# Patient Record
Sex: MALE | Race: ASIAN | Employment: FULL TIME | URBAN - METROPOLITAN AREA
[De-identification: names, ages, dates, MRNs, and addresses within clinical notes are randomized per-mention and may not be internally consistent; named-entity substitution may affect disease eponyms.]

---

## 2018-06-08 ENCOUNTER — HOSPITAL ENCOUNTER (EMERGENCY)
Age: 58
Discharge: HOME | End: 2018-06-08
Attending: EMERGENCY MEDICINE | Admitting: EMERGENCY MEDICINE
Payer: COMMERCIAL

## 2018-06-08 ENCOUNTER — APPOINTMENT (OUTPATIENT)
Dept: GENERAL RADIOLOGY | Age: 58
End: 2018-06-08
Attending: NURSE PRACTITIONER
Payer: COMMERCIAL

## 2018-06-08 VITALS
HEIGHT: 65 IN | OXYGEN SATURATION: 100 % | RESPIRATION RATE: 18 BRPM | TEMPERATURE: 98.4 F | SYSTOLIC BLOOD PRESSURE: 182 MMHG | WEIGHT: 204 LBS | HEART RATE: 67 BPM | DIASTOLIC BLOOD PRESSURE: 106 MMHG | BODY MASS INDEX: 33.99 KG/M2

## 2018-06-08 DIAGNOSIS — M17.12 PRIMARY OSTEOARTHRITIS OF LEFT KNEE: ICD-10-CM

## 2018-06-08 DIAGNOSIS — M25.562 ACUTE PAIN OF LEFT KNEE: Primary | ICD-10-CM

## 2018-06-08 DIAGNOSIS — R03.0 ELEVATED BLOOD PRESSURE READING: ICD-10-CM

## 2018-06-08 PROCEDURE — 99282 EMERGENCY DEPT VISIT SF MDM: CPT

## 2018-06-08 PROCEDURE — 74011250637 HC RX REV CODE- 250/637: Performed by: NURSE PRACTITIONER

## 2018-06-08 PROCEDURE — 73562 X-RAY EXAM OF KNEE 3: CPT

## 2018-06-08 PROCEDURE — L1830 KO IMMOB CANVAS LONG PRE OTS: HCPCS

## 2018-06-08 RX ORDER — HYDROCODONE BITARTRATE AND ACETAMINOPHEN 5; 325 MG/1; MG/1
1 TABLET ORAL
Qty: 20 TAB | Refills: 0 | Status: SHIPPED | OUTPATIENT
Start: 2018-06-08 | End: 2022-01-21

## 2018-06-08 RX ORDER — MELOXICAM 7.5 MG/1
7.5 TABLET ORAL DAILY
Qty: 7 TAB | Refills: 0 | Status: SHIPPED | OUTPATIENT
Start: 2018-06-08 | End: 2022-01-21

## 2018-06-08 RX ORDER — HYDROCODONE BITARTRATE AND ACETAMINOPHEN 5; 325 MG/1; MG/1
2 TABLET ORAL
Status: COMPLETED | OUTPATIENT
Start: 2018-06-08 | End: 2018-06-08

## 2018-06-08 RX ORDER — ONDANSETRON 4 MG/1
4 TABLET, ORALLY DISINTEGRATING ORAL
Status: COMPLETED | OUTPATIENT
Start: 2018-06-08 | End: 2018-06-08

## 2018-06-08 RX ORDER — DICLOFENAC SODIUM 10 MG/G
2 GEL TOPICAL 4 TIMES DAILY
Qty: 100 G | Refills: 0 | Status: SHIPPED | OUTPATIENT
Start: 2018-06-08 | End: 2022-01-21

## 2018-06-08 RX ORDER — MELOXICAM 15 MG/1
15 TABLET ORAL DAILY
Qty: 20 TAB | Refills: 0 | Status: SHIPPED | OUTPATIENT
Start: 2018-06-08 | End: 2018-06-08

## 2018-06-08 RX ADMIN — ONDANSETRON 4 MG: 4 TABLET, ORALLY DISINTEGRATING ORAL at 21:09

## 2018-06-08 RX ADMIN — HYDROCODONE BITARTRATE AND ACETAMINOPHEN 2 TABLET: 5; 325 TABLET ORAL at 21:09

## 2018-06-09 NOTE — DISCHARGE INSTRUCTIONS
Knee Arthritis: Care Instructions  Your Care Instructions    Knee arthritis is a breakdown of the cartilage that cushions your knee joint. When the cartilage wears down, your bones rub against each other. This causes pain and stiffness. Knee arthritis tends to get worse with time. Treatment for knee arthritis involves reducing pain, making the leg muscles stronger, and staying at a healthy body weight. The treatment usually does not improve the health of the cartilage, but it can reduce pain and improve how well your knee works. You can take simple measures to protect your knee joints, ease your pain, and help you stay active. Follow-up care is a key part of your treatment and safety. Be sure to make and go to all appointments, and call your doctor if you are having problems. It's also a good idea to know your test results and keep a list of the medicines you take. How can you care for yourself at home? · Know that knee arthritis will cause more pain on some days than on others. · Stay at a healthy weight. Lose weight if you are overweight. When you stand up, the pressure on your knees from every pound of body weight is multiplied four times. So if you lose 10 pounds, you will reduce the pressure on your knees by 40 pounds. · Talk to your doctor or physical therapist about exercises that will help ease joint pain. ¨ Stretch to help prevent stiffness and to prevent injury before you exercise. You may enjoy gentle forms of yoga to help keep your knee joints and muscles flexible. ¨ Walk instead of jog. ¨ Ride a bike. This makes your thigh muscles stronger and takes pressure off your knee. ¨ Wear well-fitting and comfortable shoes. ¨ Exercise in chest-deep water. This can help you exercise longer with less pain. ¨ Avoid exercises that include squatting or kneeling. They can put a lot of strain on your knees.   ¨ Talk to your doctor to make sure that the exercise you do is not making the arthritis worse.  · Do not sit for long periods of time. Try to walk once in a while to keep your knee from getting stiff. · Ask your doctor or physical therapist whether shoe inserts may reduce your arthritis pain. · If you can afford it, get new athletic shoes at least every year. This can help reduce the strain on your knees. · Use a device to help you do everyday activities. ¨ A cane or walking stick can help you keep your balance when you walk. Hold the cane or walking stick in the hand opposite the painful knee. ¨ If you feel like you may fall when you walk, try using crutches or a front-wheeled walker. These can prevent falls that could cause more damage to your knee. ¨ A knee brace may help keep your knee stable and prevent pain. ¨ You also can use other things to make life easier, such as a higher toilet seat and handrails in the bathtub or shower. · Take pain medicines exactly as directed. ¨ Do not wait until you are in severe pain. You will get better results if you take it sooner. ¨ If you are not taking a prescription pain medicine, take an over-the-counter medicine such as acetaminophen (Tylenol), ibuprofen (Advil, Motrin), or naproxen (Aleve). Read and follow all instructions on the label. ¨ Do not take two or more pain medicines at the same time unless the doctor told you to. Many pain medicines have acetaminophen, which is Tylenol. Too much acetaminophen (Tylenol) can be harmful. ¨ Tell your doctor if you take a blood thinner, have diabetes, or have allergies to shellfish. · Ask your doctor if you might benefit from a shot of steroid medicine into your knee. This may provide pain relief for several months. · Many people take the supplements glucosamine and chondroitin for osteoarthritis. Some people feel they help, but the medical research does not show that they work. Talk to your doctor before you take these supplements. When should you call for help?   Call your doctor now or seek immediate medical care if:  ? · You have sudden swelling, warmth, or pain in your knee. ? · You have knee pain and a fever or rash. ? · You have such bad pain that you cannot use your knee. ? Watch closely for changes in your health, and be sure to contact your doctor if you have any problems. Where can you learn more? Go to http://deanna-jarret.info/. Enter K874 in the search box to learn more about \"Knee Arthritis: Care Instructions. \"  Current as of: October 31, 2016  Content Version: 11.4  © 7817-2151 [employer]. Care instructions adapted under license by [employer] (which disclaims liability or warranty for this information). If you have questions about a medical condition or this instruction, always ask your healthcare professional. [address] any warranty or liability for your use of this information. Knee Pain or Injury: Care Instructions  Your Care Instructions    Injuries are a common cause of knee problems. Sudden (acute) injuries may be caused by a direct blow to the knee. They can also be caused by abnormal twisting, bending, or falling on the knee. Pain, bruising, or swelling may be severe, and may start within minutes of the injury. Overuse is another cause of knee pain. Other causes are climbing stairs, kneeling, and other activities that use the knee. Everyday wear and tear, especially as you get older, also can cause knee pain. Rest, along with home treatment, often relieves pain and allows your knee to heal. If you have a serious knee injury, you may need tests and treatment. Follow-up care is a key part of your treatment and safety. Be sure to make and go to all appointments, and call your doctor if you are having problems. It's also a good idea to know your test results and keep a list of the medicines you take. How can you care for yourself at home? · Be safe with medicines.  Read and follow all instructions on the label.  ¨ If the doctor gave you a prescription medicine for pain, take it as prescribed. ¨ If you are not taking a prescription pain medicine, ask your doctor if you can take an over-the-counter medicine. · Rest and protect your knee. Take a break from any activity that may cause pain. · Put ice or a cold pack on your knee for 10 to 20 minutes at a time. Put a thin cloth between the ice and your skin. · Prop up a sore knee on a pillow when you ice it or anytime you sit or lie down for the next 3 days. Try to keep it above the level of your heart. This will help reduce swelling. · If your knee is not swollen, you can put moist heat, a heating pad, or a warm cloth on your knee. · If your doctor recommends an elastic bandage, sleeve, or other type of support for your knee, wear it as directed. · Follow your doctor's instructions about how much weight you can put on your leg. Use a cane, crutches, or a walker as instructed. · Follow your doctor's instructions about activity during your healing process. If you can do mild exercise, slowly increase your activity. · Reach and stay at a healthy weight. Extra weight can strain the joints, especially the knees and hips, and make the pain worse. Losing even a few pounds may help. When should you call for help? Call 911 anytime you think you may need emergency care. For example, call if:  ? · You have symptoms of a blood clot in your lung (called a pulmonary embolism). These may include:  ¨ Sudden chest pain. ¨ Trouble breathing. ¨ Coughing up blood. ?Call your doctor now or seek immediate medical care if:  ? · You have severe or increasing pain. ? · Your leg or foot turns cold or changes color. ? · You cannot stand or put weight on your knee. ? · Your knee looks twisted or bent out of shape. ? · You cannot move your knee. ? · You have signs of infection, such as:  ¨ Increased pain, swelling, warmth, or redness.   ¨ Red streaks leading from the knee.  ¨ Pus draining from a place on your knee. ¨ A fever. ? · You have signs of a blood clot in your leg (called a deep vein thrombosis), such as:  ¨ Pain in your calf, back of the knee, thigh, or groin. ¨ Redness and swelling in your leg or groin. ? Watch closely for changes in your health, and be sure to contact your doctor if:  ? · You have tingling, weakness, or numbness in your knee. ? · You have any new symptoms, such as swelling. ? · You have bruises from a knee injury that last longer than 2 weeks. ? · You do not get better as expected. Where can you learn more? Go to http://deanna-jarret.info/. Enter K195 in the search box to learn more about \"Knee Pain or Injury: Care Instructions. \"  Current as of: March 20, 2017  Content Version: 11.4  © 3531-3990 [employer]. Care instructions adapted under license by [employer] (which disclaims liability or warranty for this information). If you have questions about a medical condition or this instruction, always ask your healthcare professional. [place] [address] any warranty or liability for your use of this information.

## 2018-06-09 NOTE — ED TRIAGE NOTES
Pt states he was sitting yesterday and went to walk and his knee up and its still locked up and knee pain

## 2018-06-09 NOTE — ED PROVIDER NOTES
HPI Comments: Love Saini is a 60 y.o. male with hx of gout who presents ambulatory w/ his wife to [facility] ED with cc of L knee pain. Pt reports intermittent L knee pain since yesterday. Primarily has noted the knee \"locks up\" after he has been sitting for long periods of time. He states two isolated episodes after sitting in meetings where his knee became very painful and difficult to move. Yesterday pain improved somewhat after it \"locked up. \" However, pt states the pain has been ongoing today and worsening which is why he presented to the ED. He took Colchicine last night before he went to bed, thinking this was possibly gout, and was able to sleep. He took Colchicine today WNR. He denies any joint warmth/ redness that are typical w/ his gout flares. No fevers, chills, body aches, SOB, CP, or difficulty breathing. He has no hx of DVT/ PE. Pt notes he has some hx of THAI and monitors his use of NSAIDs closely so he is not able to take many medications for pain management. (-) tobacco/ ETOH/ substance abuse. PCP: Shelli Yang MD    There are no other complaints, changes or physical findings at this time. The history is provided by the patient. No past medical history on file. No past surgical history on file. No family history on file. Social History     Social History    Marital status:      Spouse name: N/A    Number of children: N/A    Years of education: N/A     Occupational History    Not on file. Social History Main Topics    Smoking status: Not on file    Smokeless tobacco: Not on file    Alcohol use Not on file    Drug use: Not on file    Sexual activity: Not on file     Other Topics Concern    Not on file     Social History Narrative         ALLERGIES: [employer] [poultry]    Review of Systems   Constitutional: Negative for activity change, appetite change, chills and fever.    HENT: Negative for congestion, rhinorrhea, sinus pressure, sneezing and sore throat. Eyes: Negative for pain, discharge and visual disturbance. Respiratory: Negative for cough and shortness of breath. Cardiovascular: Negative for chest pain. Gastrointestinal: Negative for abdominal pain, diarrhea, nausea and vomiting. Genitourinary: Negative for dysuria, flank pain, frequency and urgency. Musculoskeletal: Positive for arthralgias. Negative for back pain, gait problem, joint swelling, myalgias and neck pain. Skin: Negative for color change and rash. Neurological: Negative for dizziness, speech difficulty, weakness, light-headedness, numbness and headaches. Psychiatric/Behavioral: Negative for agitation, behavioral problems and confusion. All other systems reviewed and are negative. Vitals:    06/08/18 2023 06/08/18 2100   BP: (!) 182/106    Pulse: 67 (P) 72   Resp: 18    Temp: 98.4 °F (36.9 °C)    SpO2: 100%    Weight: 92.5 kg (204 lb)    Height: 5' 5\" (1.651 m)             Physical Exam   Constitutional: He is oriented to person, place, and time. He appears well-developed and well-nourished. No distress. HENT:   Head: Normocephalic and atraumatic. Right Ear: External ear normal.   Left Ear: External ear normal.   Nose: Nose normal.   Mouth/Throat: Oropharynx is clear and moist. No oropharyngeal exudate. Eyes: Conjunctivae and EOM are normal. Pupils are equal, round, and reactive to light. Neck: Normal range of motion. Neck supple. Cardiovascular: Normal rate, regular rhythm, normal heart sounds and intact distal pulses. Pulmonary/Chest: Effort normal and breath sounds normal.   Musculoskeletal: Normal range of motion. Left knee: He exhibits bony tenderness. He exhibits normal range of motion, no swelling, no effusion, no ecchymosis, no deformity and no erythema. Tenderness found. Medial joint line, lateral joint line and patellar tendon tenderness noted. Neurological: He is alert and oriented to person, place, and time. Skin: Skin is warm and dry. Psychiatric: He has a normal mood and affect. His behavior is normal. Judgment and thought content normal.   Nursing note and vitals reviewed. MDM  Number of Diagnoses or Management Options  Acute pain of left knee:   Elevated blood pressure reading:   Primary osteoarthritis of left knee:   Diagnosis management comments: DDx: DJD, meniscal injury, ligament injury, OA     61 yo M presents w/ concern for L knee \"locking\" w/ associative pain for the last 24h. No hx of trauma, no swelling/ redness/ or warmth. X-ray w/ patellar DJD, no other findings mentioned. TTP but no swelling/ warmth/ redness on exam. Placed in knee immobilizer, pt declined crutches. Recommended RICE, Motrin and f/u w/ Ortho. Pt agrees and will set up f/u as needed. Amount and/or Complexity of Data Reviewed  Tests in the radiology section of CPT®: ordered and reviewed  Review and summarize past medical records: yes          ED Course       Procedures    LABORATORY TESTS:  No results found for this or any previous visit (from the past 12 hour(s)). IMAGING RESULTS:  XR KNEE LT 3 V   Final Result      INDICATION:   severe pain/ c/o knee locking up.     COMPARISON: None.     FINDINGS: Three views of the left knee demonstrate no fracture or other acute  osseous or articular abnormality. There is no effusion. Minimal DJD patella     IMPRESSION  IMPRESSION:  No acute abnormality.          MEDICATIONS GIVEN:  Medications   HYDROcodone-acetaminophen (NORCO) 5-325 mg per tablet 2 Tab (2 Tabs Oral Given 6/8/18 2109)   ondansetron (ZOFRAN ODT) tablet 4 mg (4 mg Oral Given 6/8/18 2109)       IMPRESSION:  1. Acute pain of left knee    2. Primary osteoarthritis of left knee    3. Elevated blood pressure reading        PLAN:  1.    Discharge Medication List as of 6/8/2018  9:30 PM      START taking these medications    Details   HYDROcodone-acetaminophen (NORCO) 5-325 mg per tablet Take 1 Tab by mouth every four (4) hours as needed for Pain. Max Daily Amount: 6 Tabs., Print, Disp-20 Tab, R-0      diclofenac (VOLTAREN) 1 % gel Apply 2 g to affected area four (4) times daily. , Print, Disp-100 g, R-0      meloxicam (MOBIC) 7.5 mg tablet Take 1 Tab by mouth daily. , Print, Disp-7 Tab, R-0           2. Follow-up Information     Follow up With Details Comments Contact Info    [facility] EMERGENCY DEPT Go to As needed, If symptoms worsen [address] [facility]  [phone]    [facility] Schedule an appointment as soon as possible for a visit  [address] [place] [address]  [phone]        3. Return to ED if worse   Discharge Note:    The patient is ready for discharge. The patient's signs, symptoms, diagnosis, and discharge instruction have been discussed and the patient has conveyed their understanding. The patient is to follow up as recommended or return to the ER should their symptoms worsen. Plan has been discussed and the patient is in agreement.     Shayy Coon NP

## 2018-06-14 ENCOUNTER — HOSPITAL ENCOUNTER (OUTPATIENT)
Dept: VASCULAR SURGERY | Age: 58
Discharge: HOME | End: 2018-06-14
Attending: ORTHOPAEDIC SURGERY
Payer: COMMERCIAL

## 2018-06-14 DIAGNOSIS — I82.4Y2: ICD-10-CM

## 2018-06-14 DIAGNOSIS — M79.662 PAIN OF LEFT CALF: ICD-10-CM

## 2018-06-14 PROCEDURE — 93971 EXTREMITY STUDY: CPT

## 2018-06-14 NOTE — PROCEDURES
[facility]  *** FINAL REPORT ***    Name: Yolie Mahajan  MRN: [id number]    Outpatient  : 16 May 1960  HIS Order #: [id number]  [id number] [facility] Visit #: [id number]  Date: 2018    TYPE OF TEST: Peripheral Venous Testing    REASON FOR TEST  Pain in limb, Limb swelling    Left Leg:-  Deep venous thrombosis:           No  Superficial venous thrombosis:    No  Deep venous insufficiency:        No  Superficial venous insufficiency: No      INTERPRETATION/FINDINGS  PROCEDURE:  LEFT LOWER EXTREMITY VENOUS DUPLEX . Evaluation of lower  extremity veins with ultrasound (B-mode imaging, pulsed Doppler, color   Doppler). Includes the common femoral, deep femoral, femoral,  popliteal, posterior tibial, peroneal, and great saphenous veins. Other veins, for example the gastrocnemius and soleal veins, may also  be visualized. FINDINGS: [place] scale and color flow duplex images of the veins in the  left lower extremity demonstrate normal compressibility, spontaneous  and augmented flow profiles, and absence of filling defects throughout   the deep and superficial veins in the left lower extremity. CONCLUSION:  Left lower extremity venous duplex negative for deep  venous thrombosis or thrombophlebitis. Right common femoral vein is  thrombus free. ADDITIONAL COMMENTS    I have personally reviewed the data relevant to the interpretation of  this  study. TECHNOLOGIST: Kirstie Germain RVT  Signed: 2018 02:14 PM    PHYSICIAN: Xander Becker.  Christina Gonzalez MD  Signed: 06/15/2018 08:09 AM

## 2019-07-24 ENCOUNTER — APPOINTMENT (OUTPATIENT)
Dept: GENERAL RADIOLOGY | Age: 59
End: 2019-07-24
Attending: PHYSICIAN ASSISTANT
Payer: COMMERCIAL

## 2019-07-24 ENCOUNTER — HOSPITAL ENCOUNTER (EMERGENCY)
Age: 59
Discharge: HOME | End: 2019-07-24
Attending: EMERGENCY MEDICINE
Payer: COMMERCIAL

## 2019-07-24 VITALS
TEMPERATURE: 98.1 F | OXYGEN SATURATION: 96 % | HEIGHT: 65 IN | HEART RATE: 64 BPM | RESPIRATION RATE: 18 BRPM | DIASTOLIC BLOOD PRESSURE: 98 MMHG | WEIGHT: 198 LBS | SYSTOLIC BLOOD PRESSURE: 153 MMHG | BODY MASS INDEX: 32.99 KG/M2

## 2019-07-24 DIAGNOSIS — S46.311A TRICEPS TENDON RUPTURE, RIGHT, INITIAL ENCOUNTER: Primary | ICD-10-CM

## 2019-07-24 LAB
ALBUMIN SERPL-MCNC: 4.2 G/DL (ref 3.5–5)
ALBUMIN/GLOB SERPL: 1.1 {RATIO} (ref 1.1–2.2)
ALP SERPL-CCNC: 84 U/L (ref 45–117)
ALT SERPL-CCNC: 44 U/L (ref 12–78)
ANION GAP SERPL CALC-SCNC: 5 MMOL/L (ref 5–15)
AST SERPL-CCNC: 26 U/L (ref 15–37)
BASOPHILS # BLD: 0.1 K/UL (ref 0–0.1)
BASOPHILS NFR BLD: 1 % (ref 0–1)
BILIRUB SERPL-MCNC: 0.6 MG/DL (ref 0.2–1)
BUN SERPL-MCNC: 13 MG/DL (ref 6–20)
BUN/CREAT SERPL: 8 (ref 12–20)
CALCIUM SERPL-MCNC: 9.4 MG/DL (ref 8.5–10.1)
CHLORIDE SERPL-SCNC: 105 MMOL/L (ref 97–108)
CO2 SERPL-SCNC: 31 MMOL/L (ref 21–32)
COMMENT, HOLDF: NORMAL
CREAT SERPL-MCNC: 1.54 MG/DL (ref 0.7–1.3)
DIFFERENTIAL METHOD BLD: ABNORMAL
EOSINOPHIL # BLD: 0.6 K/UL (ref 0–0.4)
EOSINOPHIL NFR BLD: 6 % (ref 0–7)
ERYTHROCYTE [DISTWIDTH] IN BLOOD BY AUTOMATED COUNT: 13.2 % (ref 11.5–14.5)
GLOBULIN SER CALC-MCNC: 3.9 G/DL (ref 2–4)
GLUCOSE SERPL-MCNC: 163 MG/DL (ref 65–100)
HCT VFR BLD AUTO: 47.3 % (ref 36.6–50.3)
HGB BLD-MCNC: 15.7 G/DL (ref 12.1–17)
IMM GRANULOCYTES # BLD AUTO: 0.1 K/UL (ref 0–0.04)
IMM GRANULOCYTES NFR BLD AUTO: 1 % (ref 0–0.5)
LIPASE SERPL-CCNC: 207 U/L (ref 73–393)
LYMPHOCYTES # BLD: 2.1 K/UL (ref 0.8–3.5)
LYMPHOCYTES NFR BLD: 19 % (ref 12–49)
MCH RBC QN AUTO: 30.5 PG (ref 26–34)
MCHC RBC AUTO-ENTMCNC: 33.2 G/DL (ref 30–36.5)
MCV RBC AUTO: 92 FL (ref 80–99)
MONOCYTES # BLD: 1.3 K/UL (ref 0–1)
MONOCYTES NFR BLD: 12 % (ref 5–13)
NEUTS SEG # BLD: 6.8 K/UL (ref 1.8–8)
NEUTS SEG NFR BLD: 61 % (ref 32–75)
NRBC # BLD: 0 K/UL (ref 0–0.01)
NRBC BLD-RTO: 0 PER 100 WBC
PLATELET # BLD AUTO: 204 K/UL (ref 150–400)
PMV BLD AUTO: 10.4 FL (ref 8.9–12.9)
POTASSIUM SERPL-SCNC: 3.8 MMOL/L (ref 3.5–5.1)
PROT SERPL-MCNC: 8.1 G/DL (ref 6.4–8.2)
RBC # BLD AUTO: 5.14 M/UL (ref 4.1–5.7)
SAMPLES BEING HELD,HOLD: NORMAL
SODIUM SERPL-SCNC: 141 MMOL/L (ref 136–145)
WBC # BLD AUTO: 11 K/UL (ref 4.1–11.1)

## 2019-07-24 PROCEDURE — 96361 HYDRATE IV INFUSION ADD-ON: CPT

## 2019-07-24 PROCEDURE — 73080 X-RAY EXAM OF ELBOW: CPT

## 2019-07-24 PROCEDURE — 36415 COLL VENOUS BLD VENIPUNCTURE: CPT

## 2019-07-24 PROCEDURE — 83690 ASSAY OF LIPASE: CPT

## 2019-07-24 PROCEDURE — 85025 COMPLETE CBC W/AUTO DIFF WBC: CPT

## 2019-07-24 PROCEDURE — A4565 SLINGS: HCPCS

## 2019-07-24 PROCEDURE — 99283 EMERGENCY DEPT VISIT LOW MDM: CPT

## 2019-07-24 PROCEDURE — 74011250636 HC RX REV CODE- 250/636: Performed by: PHYSICIAN ASSISTANT

## 2019-07-24 PROCEDURE — 74011250637 HC RX REV CODE- 250/637: Performed by: PHYSICIAN ASSISTANT

## 2019-07-24 PROCEDURE — 96374 THER/PROPH/DIAG INJ IV PUSH: CPT

## 2019-07-24 PROCEDURE — 80053 COMPREHEN METABOLIC PANEL: CPT

## 2019-07-24 RX ORDER — ONDANSETRON 2 MG/ML
4 INJECTION INTRAMUSCULAR; INTRAVENOUS
Status: COMPLETED | OUTPATIENT
Start: 2019-07-24 | End: 2019-07-24

## 2019-07-24 RX ORDER — TRAMADOL HYDROCHLORIDE 50 MG/1
50 TABLET ORAL
Qty: 12 TAB | Refills: 0 | Status: SHIPPED | OUTPATIENT
Start: 2019-07-24 | End: 2019-08-03

## 2019-07-24 RX ORDER — TRAMADOL HYDROCHLORIDE 50 MG/1
50 TABLET ORAL
Status: COMPLETED | OUTPATIENT
Start: 2019-07-24 | End: 2019-07-24

## 2019-07-24 RX ORDER — ONDANSETRON 4 MG/1
4 TABLET, ORALLY DISINTEGRATING ORAL
Qty: 10 TAB | Refills: 0 | Status: SHIPPED | OUTPATIENT
Start: 2019-07-24 | End: 2022-01-21

## 2019-07-24 RX ADMIN — ONDANSETRON 4 MG: 2 INJECTION INTRAMUSCULAR; INTRAVENOUS at 21:53

## 2019-07-24 RX ADMIN — SODIUM CHLORIDE 1000 ML: 900 INJECTION, SOLUTION INTRAVENOUS at 21:53

## 2019-07-24 RX ADMIN — TRAMADOL HYDROCHLORIDE 50 MG: 50 TABLET, FILM COATED ORAL at 21:53

## 2019-07-25 NOTE — ED TRIAGE NOTES
Pt reports right arm and shoulder pain with numbness and tingling since Monday. Pain getting worse today.

## 2019-07-25 NOTE — DISCHARGE INSTRUCTIONS
Patient Education        Tendon Injury (Tendinopathy): Care Instructions  Your Care Instructions    Tendons are tough, flexible tissues that connect muscle to bone. A tendon can hurt or get torn from overuse or aging, especially tendons in the shoulder, elbow, wrist, hip, knee, or ankle. Tendon injuries may be called tendinopathy or tendinitis. Tendon injuries can occur from any motion you have to repeat in a job, sports, or daily activities. Tennis elbow is one common tendon injury. You can treat most tendon problems with over-the-counter pain medicine, rest, changes in your activities, and physical therapy. Follow-up care is a key part of your treatment and safety. Be sure to make and go to all appointments, and call your doctor if you are having problems. It's also a good idea to know your test results and keep a list of the medicines you take. How can you care for yourself at home? · Rest the sore area. You may have to stop doing the activity that caused the tendon pain for a while. · Take an over-the-counter pain medicine, such as acetaminophen (Tylenol), ibuprofen (Advil, Motrin), or naproxen (Aleve). Read and follow all instructions on the label. · Do not take two or more pain medicines at the same time unless the doctor told you to. Many pain medicines have acetaminophen, which is Tylenol. Too much acetaminophen (Tylenol) can be harmful. · Put ice or a cold pack on the sore area for 10 to 20 minutes at a time. Try to do this every 1 to 2 hours for the next 3 days (when you are awake) or until any swelling goes down. Put a thin cloth between the ice and your skin. · Prop up the sore area on a pillow when you ice it or anytime you sit or lie down during the next 3 days. Try to keep it above the level of your heart. This will help reduce swelling.   · Follow your doctor's advice for wearing and caring for a sling, splint, or cast. In some cases, you may wear one of these for a while to help your tendon heal.  · Follow your doctor's advice for stretching and physical therapy. Gently move your joint through its full range of motion. This will prevent stiffness in your joint. · Go back to your activity slowly. Warm up before and stretch after the activity. You also can try making some changes. For example, if a sport caused your tendon pain, alternate the sport with another activity. If using a tool causes pain, switch hands or change your . Stop the activity if it hurts. After the activity, apply ice to prevent pain and swelling. · Do not smoke. Smoking can slow healing. If you need help quitting, talk to your doctor about stop-smoking programs and medicines. These can increase your chances of quitting for good. When should you call for help? Watch closely for changes in your health, and be sure to contact your doctor if:    · Your pain gets worse.     · You do not get better as expected. Where can you learn more? Go to http://deanna-jarret.info/. Enter A157 in the search box to learn more about \"Tendon Injury (Tendinopathy): Care Instructions. \"  Current as of: September 20, 2018  Content Version: 12.1  © 0318-2360 [employer]. Care instructions adapted under license by [employer] (which disclaims liability or warranty for this information). If you have questions about a medical condition or this instruction, always ask your healthcare professional. [address] any warranty or liability for your use of this information.

## 2019-07-25 NOTE — ED PROVIDER NOTES
Patient reports intermittent RUE and right shoulder pain since Monday night, worse today with numbness and tingling. He states \"I think I popped a tendon, I heard it pop. \"    I have evaluated the patient as the Provider in Triage. I have reviewed his vital signs and the triage nurse assessment. I have talked with the patient and any available family and advised that I am the provider in triage and have ordered the appropriate study to initiate their work up based on the clinical presentation during my assessment. I have advised that the patient will be accommodated in the Main ED as soon as possible. I have also requested to contact the triage nurse or myself immediately if the patient experiences any changes in their condition during this brief waiting period. Note written by Becca Mandujano, as dictated by Ariana Montesinos MD 8:22 PM    ---  60 y.o. male with past medical history significant for previous right shoulder dislocation presents ambulatory and accompanied by wife with chief complaint of RUE pain, currently a 10/10 in severity, onset yesterday after moving a 119 lb crate and states while moving the crate he felt a sudden \"pop\" just above the back side of his elbow joint, followed by immediate onset of pain and LROM of R elbow due to pain. . Today, the pt presents explaining that the pain has become progressively worse, adding that he is unable to lift his RUE. Pt also reports pain with both flexion and extension. On arrival, the pt reports associated swelling, explaining that he can see \"an obvious difference in tone\" between his two triceps. He also incidentally noted tingling in the palms of both hands since cutting bamboo with a chainsaw 3 days ago, noticing tingling the next day. He also indicates hx of elevated creatinine and was [facility] which he is scheduled to see on 08/12/19. He states when the pain occurs he feels a wave of nausea and vomits.  Noting he's vomited 4 times today. Per spouse, the pt has an appointment with his orthopedist this coming Monday morning. Pt denies any fevers, chest pain, shortness of breath, or any other symptoms at this time. Pt denies any history of seizures. There are no other acute medical concerns at this time. Social hx: None  PCP: Cindy Fishman MD    Note written by Mookie Hudson, as dictated by Adriana Ramirez PA-C, 9:32 PM  brooklynn Jennings    The history is provided by the patient and the spouse. No  was used. No past medical history on file. No past surgical history on file. No family history on file.     Social History     Socioeconomic History    Marital status:      Spouse name: Not on file    Number of children: Not on file    Years of education: Not on file    Highest education level: Not on file   Occupational History    Not on file   Social Needs    Financial resource strain: Not on file    Food insecurity:     Worry: Not on file     Inability: Not on file    Transportation needs:     Medical: Not on file     Non-medical: Not on file   Tobacco Use    Smoking status: Not on file   Substance and Sexual Activity    Alcohol use: Not on file    Drug use: Not on file    Sexual activity: Not on file   Lifestyle    Physical activity:     Days per week: Not on file     Minutes per session: Not on file    Stress: Not on file   Relationships    Social connections:     Talks on phone: Not on file     Gets together: Not on file     Attends Dru[employer] service: Not on file     Active member of club or organization: Not on file     Attends meetings of clubs or organizations: Not on file     Relationship status: Not on file    Intimate partner violence:     Fear of current or ex partner: Not on file     Emotionally abused: Not on file     Physically abused: Not on file     Forced sexual activity: Not on file   Other Topics Concern    Not on file   Social History Narrative  Not on file         ALLERGIES: Apple and Duck [poultry]    Review of Systems   Constitutional: Negative for chills and fever. Respiratory: Negative for shortness of breath. Cardiovascular: Negative for chest pain. Gastrointestinal: Positive for nausea (secondary to pain) and vomiting. Negative for diarrhea (compared to baseline). Musculoskeletal: Positive for myalgias (RUE). Neurological: Negative for numbness and headaches. All other systems reviewed and are negative. Vitals:    07/24/19 2023   BP: 140/74   Pulse: 64   Resp: 18   Temp: 98.1 °F (36.7 °C)   SpO2: 96%   Weight: 89.8 kg (198 lb)   Height: 5' 5\" (1.651 m)            Physical Exam   Constitutional: He is oriented to person, place, and time. He appears well-developed and well-nourished. He is active. Non-toxic appearance. No distress. WM   HENT:   Head: Normocephalic and atraumatic. Eyes: Pupils are equal, round, and reactive to light. Conjunctivae are normal. Right eye exhibits no discharge. Left eye exhibits no discharge. Neck: Normal range of motion and full passive range of motion without pain. No tracheal tenderness present. Cardiovascular: Normal rate, regular rhythm, normal heart sounds, intact distal pulses and normal pulses. Exam reveals no gallop and no friction rub. No murmur heard. Pulmonary/Chest: Effort normal and breath sounds normal. No respiratory distress. He has no wheezes. He has no rales. He exhibits no tenderness. Abdominal: Soft. Bowel sounds are normal. He exhibits no distension. There is no tenderness. There is no rebound and no guarding. Musculoskeletal: Normal range of motion. He exhibits edema and tenderness. Arms:  Neurological: He is alert and oriented to person, place, and time. He has normal strength. No cranial nerve deficit or sensory deficit. Coordination normal.   Skin: Skin is warm, dry and intact. Capillary refill takes less than 2 seconds. No abrasion and no rash noted.  He is not diaphoretic. No erythema. Psychiatric: He has a normal mood and affect. His speech is normal and behavior is normal. Cognition and memory are normal.   Nursing note and vitals reviewed. MDM  Number of Diagnoses or Management Options  Triceps tendon rupture, right, initial encounter:   Diagnosis management comments:   Triceps tendon rupture, bursitis, electrolyte abnormality, THAI, dehydration       Amount and/or Complexity of Data Reviewed  Clinical lab tests: ordered and reviewed  Tests in the radiology section of CPT®: ordered  Review and summarize past medical records: yes  Discuss the patient with other providers: yes    Patient Progress  Patient progress: stable         Procedures    I discussed patient's PMH, exam findings as well as careplan with the ER attending who agrees with care plan. Eveline Paz PA-C        MEDICATIONS GIVEN:  Medications   traMADol Lonnie Webb) tablet 50 mg (50 mg Oral Given 7/24/19 2153)   ondansetron [facility] injection 4 mg (4 mg IntraVENous Given 7/24/19 2153)   sodium chloride 0.9 % bolus infusion 1,000 mL (0 mL IntraVENous IV Completed 7/24/19 2238)         DISCHARGE NOTE:  The patient's results have been reviewed with them and/or available family. Patient and/or family verbally conveyed their understanding and agreement of the patient's signs, symptoms, diagnosis, treatment and prognosis and additionally agree to follow up as recommended in the discharge instructions or to return to the Emergency Room should their condition change prior to their follow-up appointment. The patient/family verbally agrees with the care-plan and verbally conveys that all of their questions have been answered.  The discharge instructions have also been provided to the patient and/or family with some educational information regarding the patient's diagnosis as well a list of reasons why the patient would want to return to the ER prior to their follow-up appointment, should their condition change. Plan:  1. F/U with orthopedist  2. Rx tramadol, zofran  3.  Ace wrap, sling applied  Return precautions discussed and advised to return to ER if worse

## 2019-09-13 ENCOUNTER — HOSPITAL ENCOUNTER (OUTPATIENT)
Dept: ULTRASOUND IMAGING | Age: 59
Discharge: HOME | End: 2019-09-13
Attending: INTERNAL MEDICINE
Payer: COMMERCIAL

## 2019-09-13 DIAGNOSIS — I10 ESSENTIAL HYPERTENSION, MALIGNANT: ICD-10-CM

## 2019-09-13 DIAGNOSIS — R79.89 HYPOURICEMIA: ICD-10-CM

## 2019-09-13 PROCEDURE — 76770 US EXAM ABDO BACK WALL COMP: CPT

## 2021-03-02 LAB
CREATININE, EXTERNAL: 1.82
HBA1C MFR BLD HPLC: 11.9 %
LDL-C, EXTERNAL: 75

## 2021-04-30 ENCOUNTER — OFFICE VISIT (OUTPATIENT)
Dept: ENDOCRINOLOGY | Age: 61
End: 2021-04-30
Payer: COMMERCIAL

## 2021-04-30 VITALS
DIASTOLIC BLOOD PRESSURE: 79 MMHG | BODY MASS INDEX: 32.15 KG/M2 | WEIGHT: 193 LBS | TEMPERATURE: 97.4 F | OXYGEN SATURATION: 98 % | HEART RATE: 55 BPM | RESPIRATION RATE: 16 BRPM | SYSTOLIC BLOOD PRESSURE: 136 MMHG | HEIGHT: 65 IN

## 2021-04-30 DIAGNOSIS — E78.2 MIXED HYPERLIPIDEMIA: ICD-10-CM

## 2021-04-30 DIAGNOSIS — N18.30 STAGE 3 CHRONIC KIDNEY DISEASE, UNSPECIFIED WHETHER STAGE 3A OR 3B CKD (HCC): ICD-10-CM

## 2021-04-30 DIAGNOSIS — E11.65 TYPE 2 DIABETES MELLITUS WITH HYPERGLYCEMIA, UNSPECIFIED WHETHER LONG TERM INSULIN USE (HCC): Primary | ICD-10-CM

## 2021-04-30 LAB
ANION GAP SERPL CALC-SCNC: 7 MMOL/L (ref 5–15)
BUN SERPL-MCNC: 16 MG/DL (ref 6–20)
BUN/CREAT SERPL: 12 (ref 12–20)
CALCIUM SERPL-MCNC: 9.7 MG/DL (ref 8.5–10.1)
CHLORIDE SERPL-SCNC: 104 MMOL/L (ref 97–108)
CO2 SERPL-SCNC: 29 MMOL/L (ref 21–32)
CREAT SERPL-MCNC: 1.36 MG/DL (ref 0.7–1.3)
EST. AVERAGE GLUCOSE BLD GHB EST-MCNC: 266 MG/DL
GLUCOSE SERPL-MCNC: 239 MG/DL (ref 65–100)
HBA1C MFR BLD: 10.9 % (ref 4–5.6)
LDLC SERPL DIRECT ASSAY-MCNC: 104 MG/DL (ref 0–100)
POTASSIUM SERPL-SCNC: 4 MMOL/L (ref 3.5–5.1)
SODIUM SERPL-SCNC: 140 MMOL/L (ref 136–145)

## 2021-04-30 PROCEDURE — 99204 OFFICE O/P NEW MOD 45 MIN: CPT | Performed by: INTERNAL MEDICINE

## 2021-04-30 RX ORDER — VALACYCLOVIR HYDROCHLORIDE 500 MG/1
TABLET, FILM COATED ORAL
COMMUNITY
Start: 2021-02-20

## 2021-04-30 RX ORDER — COLCHICINE 0.6 MG/1
TABLET ORAL
COMMUNITY
Start: 2021-04-23

## 2021-04-30 RX ORDER — NEBIVOLOL 10 MG/1
10 TABLET ORAL DAILY
COMMUNITY

## 2021-04-30 RX ORDER — POTASSIUM CHLORIDE 750 MG/1
1 TABLET, FILM COATED, EXTENDED RELEASE ORAL AS NEEDED
COMMUNITY
Start: 2021-02-28 | End: 2022-01-21

## 2021-04-30 NOTE — LETTER
5/1/2021 Patient: Arlet Gerardo YOB: 1960 Date of Visit: 4/30/2021 Salma Rust MD 
[address] [facility] [address] [address] [address] Via Fax: [phone] Dear Salma Rust MD, Thank you for referring Mr. Nohemy Anderson to [id number] [place] [address] for evaluation. My notes for this consultation are attached. If you have questions, please do not hesitate to call me. I look forward to following your patient along with you. Sincerely, Tiffany Trinh MD

## 2021-04-30 NOTE — PROGRESS NOTES
Giana Castillo MD          Patient Information Name : Yoli Poon 63 y.o.   YOB: 1960         Referred by: Syeda Worrell MD         Chief Complaint   Patient presents with    New Patient     referred for DM       History of Present Illness: Yoli Poon is a 63 y.o. male here for initial visit of  Diabetes Mellitus. Diabetes mellitus was diagnosed in 2021 . End organ effects of diabetes: none. Monitoring frequency:none  /day and readings run not applicable  [facility], polydipsia, weight loss which he attributes to increasing walking, he was under lot of work stress, drinking [address] Dew's, A1c was close to 12. Now he is drinking [address], less stress, no polyuria, polydipsia, no weakness. His not on any medications. He has no meter. Hypoglycemia: no    Weight trend: decreasing steadily  Prior visit with dietician: no  Current diet: well balanced  1 soda a day  Current exercise: Walking    No chest pain,blurred vision  No history of pancreatitis    Wt Readings from Last 3 Encounters:   04/30/21 193 lb (87.5 kg)   07/24/19 198 lb (89.8 kg)   06/08/18 204 lb (92.5 kg)       BP Readings from Last 3 Encounters:   04/30/21 136/79   07/24/19 (!) 153/98   06/08/18 (!) 182/106           Past Medical History:   Diagnosis Date    Diabetes ([facility]     Gout     Hypertension     Type 2 diabetes mellitus (HCC)      Current Outpatient Medications   Medication Sig    colchicine 0.6 mg tablet TAKE 1 TABLET BY MOUTH ONCE DAILY    nebivoloL (Bystolic) 10 mg tablet Take 10 mg by mouth daily.  valACYclovir (VALTREX) 500 mg tablet TAKE 1 TABLET BY MOUTH ONCE DAILY    potassium chloride SR (KLOR-CON 10) 10 mEq tablet Take 1 Tab by mouth as needed.  ondansetron (ZOFRAN ODT) 4 mg disintegrating tablet Take 1 Tab by mouth every eight (8) hours as needed for Nausea.     HYDROcodone-acetaminophen (NORCO) 5-325 mg per tablet Take 1 Tab by mouth every four (4) hours as needed for Pain. Max Daily Amount: 6 Tabs.  diclofenac (VOLTAREN) 1 % gel Apply 2 g to affected area four (4) times daily.  meloxicam (MOBIC) 7.5 mg tablet Take 1 Tab by mouth daily. No current facility-administered medications for this visit. Allergies   Allergen Reactions    Apple Anaphylaxis    Duck [Poultry] Anaphylaxis     Duck eggs       Review of Systems:  Per HPI    Physical Examination:   Blood pressure 136/79, pulse (!) 55, temperature 97.4 °F (36.3 °C), temperature source Oral, resp. rate 16, height 5' 5\" (1.651 m), weight 193 lb (87.5 kg), SpO2 98 %. Estimated body mass index is 32.12 kg/m² as calculated from the following:    Height as of this encounter: 5' 5\" (1.651 m). -   Weight as of this encounter: 193 lb (87.5 kg). - General: pleasant, no distress, good eye contact  - HEENT: no pallor, no periorbital edema, EOMI  - Neck: supple, no thyromegaly, no nodules  - Cardiovascular: regular,  normal S1 and S2,   - Respiratory: clear to auscultation bilaterally  - Gastrointestinal: soft, nontender, nondistended,  BS +  - Musculoskeletal: no edema  - Neurological: alert and oriented  -           Data Reviewed:        [x] Reviewed labs    Lab Results   Component Value Date/Time    Hemoglobin A1c 10.9 (H) 04/30/2021 11:54 AM    Glucose 239 (H) 04/30/2021 11:54 AM    LDL,Direct 104 (H) 04/30/2021 11:54 AM    Creatinine 1.36 (H) 04/30/2021 11:54 AM          Assessment/Plan:     1. Type 2 diabetes mellitus with hyperglycemia, unspecified whether long term insulin use ([address]    2. Mixed hyperlipidemia        1. Type 2 Diabetes Mellitus   Lab Results   Component Value Date/Time    Hemoglobin A1c 10.9 (H) 04/30/2021 11:54 AM   Newly diagnosed, severe hyperglycemia. He is not convinced about the diagnosis as he has no symptoms. The labs where from February 2021.   There is improvement in his symptoms now.  Recheck labs,  Janumet, Actos      Diabetic issues reviewed : glycemic goals , written exchange diet given, low carbohydrate diet, weight control , home glucose monitoring emphasized,  hypoglycemia management and long term diabetic complications discussed. 3. Hyperlipidemia : Check labs      4. ARF:    4.nonmorbid obesity per medical criteria :Body mass index is 32.12 kg/m². Discussed about the importance of exercise and carbohydrate portion control. I have discussed the diagnosis with the patient and the intended plan as seen in the above orders. The patient has received an after-visit summary and questions were answered concerning future plans. I have discussed medication side effects . There are no Patient Instructions on file for this visit. Follow-up and Dispositions    · Return in about 6 weeks (around 6/11/2021). Thank you for allowing me to participate in the care of this patient. Misa Serna MD      Patient verbalized understanding     Voice-recognition software was used to generate this report, which may result in some phonetic-based errors in the grammar and contents. Even though attempts were made to correct all the mistakes, some may have been missed and remained in the body of the report.

## 2021-04-30 NOTE — PROGRESS NOTES
Rubin Huerta is a 63 y.o. male here for   Chief Complaint   Patient presents with    New Patient     referred for DM       1. Have you been to the ER, urgent care clinic since your last visit? Hospitalized since your last visit? -n/a    2. Have you seen or consulted any other health care providers outside of the [address] [place] [address] since your last visit?   Include any pap smears or colon screening.-n/a

## 2021-05-01 LAB — C PEPTIDE SERPL-MCNC: 10.6 NG/ML (ref 1.1–4.4)

## 2021-05-01 RX ORDER — BLOOD SUGAR DIAGNOSTIC
STRIP MISCELLANEOUS
Qty: 100 STRIP | Refills: 3 | Status: SHIPPED | OUTPATIENT
Start: 2021-05-01

## 2021-05-01 RX ORDER — PIOGLITAZONEHYDROCHLORIDE 30 MG/1
30 TABLET ORAL DAILY
Qty: 30 TAB | Refills: 2 | Status: SHIPPED | OUTPATIENT
Start: 2021-05-01 | End: 2021-06-11 | Stop reason: SDUPTHER

## 2021-05-01 RX ORDER — SITAGLIPTIN AND METFORMIN HYDROCHLORIDE 100; 1000 MG/1; MG/1
TABLET, FILM COATED, EXTENDED RELEASE ORAL
Qty: 30 TAB | Refills: 3 | Status: SHIPPED | OUTPATIENT
Start: 2021-05-01 | End: 2021-06-11 | Stop reason: SDUPTHER

## 2021-05-01 RX ORDER — BLOOD-GLUCOSE METER
EACH MISCELLANEOUS
Qty: 1 EACH | Refills: 3 | Status: SHIPPED | OUTPATIENT
Start: 2021-05-01

## 2021-05-01 RX ORDER — LANCETS
EACH MISCELLANEOUS
Qty: 100 EACH | Refills: 4 | Status: SHIPPED | OUTPATIENT
Start: 2021-05-01

## 2021-05-02 NOTE — PROGRESS NOTES
Based on the blood test he has diabetes and blood glucose is still elevated.   He needs medication which I have already sent to the pharmacy, 2 kinds of medication which needs to be taken in the morning (Janumet and Actos    I have also sent the meter to the pharmacy

## 2021-05-03 ENCOUNTER — TELEPHONE (OUTPATIENT)
Dept: ENDOCRINOLOGY | Age: 61
End: 2021-05-03

## 2021-05-03 NOTE — TELEPHONE ENCOUNTER
----- Message from Ez Lira MD sent at 5/1/2021 10:41 PM EDT -----  Based on the blood test he has diabetes and blood glucose is still elevated.   He needs medication which I have already sent to the pharmacy, 2 kinds of medication which needs to be taken in the morning (Janumet and Actos    I have also sent the meter to the pharmacy

## 2021-05-03 NOTE — TELEPHONE ENCOUNTER
Per Dr. Jose Eduardo Martinez, informed pt of result note, as noted above. Pt verbalized understanding and wanted to confirm his f/u appt. appt confirmed. No further questions or concerns at this time.

## 2021-05-25 PROBLEM — I10 HYPERTENSION: Status: ACTIVE | Noted: 2021-05-25

## 2021-05-25 PROBLEM — K21.9 GERD (GASTROESOPHAGEAL REFLUX DISEASE): Status: ACTIVE | Noted: 2021-05-25

## 2021-05-25 PROBLEM — M10.9 GOUT: Status: ACTIVE | Noted: 2021-05-25

## 2021-05-25 PROBLEM — E11.65 TYPE 2 DIABETES MELLITUS WITH HYPERGLYCEMIA, WITHOUT LONG-TERM CURRENT USE OF INSULIN (HCC): Status: ACTIVE | Noted: 2021-05-25

## 2021-06-11 ENCOUNTER — OFFICE VISIT (OUTPATIENT)
Dept: ENDOCRINOLOGY | Age: 61
End: 2021-06-11
Payer: COMMERCIAL

## 2021-06-11 VITALS
HEIGHT: 65 IN | SYSTOLIC BLOOD PRESSURE: 126 MMHG | WEIGHT: 185 LBS | OXYGEN SATURATION: 99 % | TEMPERATURE: 97.3 F | BODY MASS INDEX: 30.82 KG/M2 | DIASTOLIC BLOOD PRESSURE: 75 MMHG

## 2021-06-11 DIAGNOSIS — E78.2 MIXED HYPERLIPIDEMIA: ICD-10-CM

## 2021-06-11 DIAGNOSIS — E11.65 TYPE 2 DIABETES MELLITUS WITH HYPERGLYCEMIA, UNSPECIFIED WHETHER LONG TERM INSULIN USE (HCC): ICD-10-CM

## 2021-06-11 DIAGNOSIS — I10 ESSENTIAL HYPERTENSION: ICD-10-CM

## 2021-06-11 DIAGNOSIS — E11.65 TYPE 2 DIABETES MELLITUS WITH HYPERGLYCEMIA, WITHOUT LONG-TERM CURRENT USE OF INSULIN (HCC): Primary | ICD-10-CM

## 2021-06-11 DIAGNOSIS — N18.30 STAGE 3 CHRONIC KIDNEY DISEASE, UNSPECIFIED WHETHER STAGE 3A OR 3B CKD (HCC): ICD-10-CM

## 2021-06-11 PROCEDURE — 99214 OFFICE O/P EST MOD 30 MIN: CPT | Performed by: INTERNAL MEDICINE

## 2021-06-11 RX ORDER — SITAGLIPTIN AND METFORMIN HYDROCHLORIDE 100; 1000 MG/1; MG/1
TABLET, FILM COATED, EXTENDED RELEASE ORAL
Qty: 30 TABLET | Refills: 11 | Status: SHIPPED | OUTPATIENT
Start: 2021-06-11 | End: 2022-06-20

## 2021-06-11 RX ORDER — PIOGLITAZONEHYDROCHLORIDE 30 MG/1
30 TABLET ORAL DAILY
Qty: 30 TABLET | Refills: 11 | Status: SHIPPED | OUTPATIENT
Start: 2021-06-11 | End: 2022-01-21

## 2021-06-11 NOTE — LETTER
6/12/2021 Patient: Tika Melissa YOB: 1960 Date of Visit: 6/11/2021 Tabby Moreno MD 
[address] [facility] [address] [facility] [address] Via Fax: [phone] Dear Tabby Moreno MD, Thank you for referring Mr. Anne Marie White to [id number] [place] [address] for evaluation. My notes for this consultation are attached. If you have questions, please do not hesitate to call me. I look forward to following your patient along with you. Sincerely, Laquita Tamez MD

## 2021-06-11 NOTE — PROGRESS NOTES
Whitney Tsai MD          Patient Information Name : Naveen Sanchez 57 y.o.   YOB: 1960         Referred by: Kg Booker MD         Chief Complaint   Patient presents with    Diabetes       History of Present Illness: Naveen Sanchez is a 57 y.o. male here for follow-up    Diabetes mellitus was diagnosed in 2021 . End organ effects of diabetes: none. She is checking the blood glucose twice daily, fasting less than 120, postprandial less than 140  He has quit sodas, change the diet  Lost weight  Taking medications consistently  Was on long walking    No chest pain,blurred vision  No history of pancreatitis    Wt Readings from Last 3 Encounters:   06/11/21 185 lb (83.9 kg)   04/30/21 193 lb (87.5 kg)   07/24/19 198 lb (89.8 kg)       BP Readings from Last 3 Encounters:   06/11/21 126/75   04/30/21 136/79   07/24/19 (!) 153/98           Past Medical History:   Diagnosis Date    [facility]     Gout     Hypertension     Type 2 diabetes mellitus (HCC)      Current Outpatient Medications   Medication Sig    SITagliptin-metFORMIN (Janumet XR) 100-1,000 mg TM24 1 tablet p.o. with breakfast daily    pioglitazone (ACTOS) 30 mg tablet Take 1 Tab by mouth daily.  glucose blood VI test strips (OneTouch Verio test strips) strip Check blood glucose twice daily before breakfast and bedtime    Blood-Glucose Meter (OneTouch Verio Meter) misc To check blood glucose    lancets misc To check blood glucose twice daily    colchicine 0.6 mg tablet TAKE 1 TABLET BY MOUTH ONCE DAILY    nebivoloL (Bystolic) 10 mg tablet Take 10 mg by mouth daily.  valACYclovir (VALTREX) 500 mg tablet TAKE 1 TABLET BY MOUTH ONCE DAILY    HYDROcodone-acetaminophen (NORCO) 5-325 mg per tablet Take 1 Tab by mouth every four (4) hours as needed for Pain. Max Daily Amount: 6 Tabs.     diclofenac (VOLTAREN) 1 % gel Apply 2 g to affected area four (4) times daily.    potassium chloride SR (KLOR-CON 10) 10 mEq tablet Take 1 Tab by mouth as needed. (Patient not taking: Reported on 6/11/2021)    ondansetron (ZOFRAN ODT) 4 mg disintegrating tablet Take 1 Tab by mouth every eight (8) hours as needed for Nausea. (Patient not taking: Reported on 6/11/2021)    meloxicam (MOBIC) 7.5 mg tablet Take 1 Tab by mouth daily. (Patient not taking: Reported on 6/11/2021)     No current facility-administered medications for this visit. Allergies   Allergen Reactions    Apple Anaphylaxis    Duck [Poultry] Anaphylaxis     Duck eggs       Review of Systems:  Per HPI    Physical Examination:   Blood pressure 126/75, temperature 97.3 °F (36.3 °C), height 5' 5\" (1.651 m), weight 185 lb (83.9 kg), SpO2 99 %. Estimated body mass index is 30.79 kg/m² as calculated from the following:    Height as of this encounter: 5' 5\" (1.651 m). -   Weight as of this encounter: 185 lb (83.9 kg). - General: pleasant, no distress, good eye contact  - HEENT: no pallor, no periorbital edema, EOMI  - Neck: supple, no thyromegaly,  - Cardiovascular: regular,  normal S1 and S2,   - Respiratory: clear to auscultation bilaterally  -   - Musculoskeletal: no edema  - Neurological: alert and oriented  -           Data Reviewed:        [x] Reviewed labs    Lab Results   Component Value Date/Time    Hemoglobin A1c 10.9 (H) 04/30/2021 11:54 AM    Glucose 239 (H) 04/30/2021 11:54 AM    LDL,Direct 104 (H) 04/30/2021 11:54 AM    Creatinine 1.36 (H) 04/30/2021 11:54 AM          Assessment/Plan:     1. Type 2 diabetes mellitus with hyperglycemia, without long-term current use of insulin ([address]    2. Essential hypertension        1.  Type 2 Diabetes Mellitus   Lab Results   Component Value Date/Time    Hemoglobin A1c 10.9 (H) 04/30/2021 11:54 AM   Controlled, significant improvement due to lifestyle changes  Janumet, Actos      Diabetic issues reviewed : glycemic goals , written exchange diet given, low carbohydrate diet, weight control , home glucose monitoring emphasized,  hypoglycemia management and long term diabetic complications discussed. 3. Hyperlipidemia : He would like to control with diet,          4.nonmorbid obesity per medical criteria :Body mass index is 30.79 kg/m². Discussed about the importance of exercise and carbohydrate portion control. I have discussed the diagnosis with the patient and the intended plan as seen in the above orders. The patient has received an after-visit summary and questions were answered concerning future plans. I have discussed medication side effects . There are no Patient Instructions on file for this visit. Thank you for allowing me to participate in the care of this patient. Kit Trujillo MD      Patient verbalized understanding     Voice-recognition software was used to generate this report, which may result in some phonetic-based errors in the grammar and contents. Even though attempts were made to correct all the mistakes, some may have been missed and remained in the body of the report.

## 2021-09-13 LAB
ALBUMIN/CREAT UR: 3 MG/G CREAT (ref 0–29)
BUN SERPL-MCNC: 25 MG/DL (ref 8–27)
BUN/CREAT SERPL: 16 (ref 10–24)
CALCIUM SERPL-MCNC: 10.1 MG/DL (ref 8.6–10.2)
CHLORIDE SERPL-SCNC: 101 MMOL/L (ref 96–106)
CO2 SERPL-SCNC: 28 MMOL/L (ref 20–29)
CREAT SERPL-MCNC: 1.59 MG/DL (ref 0.76–1.27)
CREAT UR-MCNC: 109.5 MG/DL
EST. AVERAGE GLUCOSE BLD GHB EST-MCNC: 120 MG/DL
GLUCOSE SERPL-MCNC: 91 MG/DL (ref 65–99)
HBA1C MFR BLD: 5.8 % (ref 4.8–5.6)
INTERPRETATION: NORMAL
LDLC SERPL DIRECT ASSAY-MCNC: 143 MG/DL (ref 0–99)
MICROALBUMIN UR-MCNC: 3.3 UG/ML
POTASSIUM SERPL-SCNC: 4.6 MMOL/L (ref 3.5–5.2)
SODIUM SERPL-SCNC: 141 MMOL/L (ref 134–144)

## 2021-09-17 ENCOUNTER — OFFICE VISIT (OUTPATIENT)
Dept: ENDOCRINOLOGY | Age: 61
End: 2021-09-17
Payer: COMMERCIAL

## 2021-09-17 VITALS
HEART RATE: 59 BPM | OXYGEN SATURATION: 96 % | BODY MASS INDEX: 31.4 KG/M2 | DIASTOLIC BLOOD PRESSURE: 78 MMHG | RESPIRATION RATE: 14 BRPM | SYSTOLIC BLOOD PRESSURE: 124 MMHG | WEIGHT: 188.5 LBS | TEMPERATURE: 97.6 F | HEIGHT: 65 IN

## 2021-09-17 DIAGNOSIS — E11.65 TYPE 2 DIABETES MELLITUS WITH HYPERGLYCEMIA, WITHOUT LONG-TERM CURRENT USE OF INSULIN (HCC): Primary | ICD-10-CM

## 2021-09-17 DIAGNOSIS — I10 ESSENTIAL HYPERTENSION: ICD-10-CM

## 2021-09-17 PROCEDURE — 99214 OFFICE O/P EST MOD 30 MIN: CPT | Performed by: INTERNAL MEDICINE

## 2021-09-17 NOTE — LETTER
9/18/2021    Patient: Donna Ha   YOB: 1960   Date of Visit: 9/17/2021     Pascale Dinh MD  [address] [facility]  [address] [id number]  Via Fax: [phone]    Dear Pascale Dinh MD,      Thank you for referring Mr. Susy Rdz to [id number] [place] [address] for evaluation. My notes for this consultation are attached. If you have questions, please do not hesitate to call me. I look forward to following your patient along with you.       Sincerely,    Rainer Chen MD

## 2021-09-17 NOTE — PROGRESS NOTES
Marciano Israel is a 57 y.o. male here for   Chief Complaint   Patient presents with    Diabetes       1. Have you been to the ER, urgent care clinic since your last visit? Hospitalized since your last visit? - no  2. Have you seen or consulted any other health care providers outside of the [address] [place] [address] since your last visit?   Include any pap smears or colon screening.- PCP  Order placed for pt,  per Verbal Order with read back from Dr Mason Doing 9/17/2021

## 2021-09-17 NOTE — PROGRESS NOTES
Honey Herring MD          Patient Information Name : Kirsten Gong 57 y.o.   YOB: 1960         Referred by: Martha Pelayo MD         Chief Complaint   Patient presents with    Diabetes       History of Present Illness: Kirsten Gong is a 57 y.o. male here for follow-up    Diabetes mellitus was diagnosed in 2021 . End organ effects of diabetes: none. She is checking the blood glucose twice daily, fasting less than 120, postprandial less than 140  He has quit sodas, weight has been stable  Taking medications consistently  Physically active    No chest pain,blurred vision  No history of pancreatitis    Wt Readings from Last 3 Encounters:   09/17/21 188 lb 8 oz (85.5 kg)   06/11/21 185 lb (83.9 kg)   04/30/21 193 lb (87.5 kg)       BP Readings from Last 3 Encounters:   09/17/21 124/78   06/11/21 126/75   04/30/21 136/79           Past Medical History:   Diagnosis Date    Diabetes ([facility]     Gout     Hypertension     Type 2 diabetes mellitus (HCC)      Current Outpatient Medications   Medication Sig    SITagliptin-metFORMIN (Janumet XR) 100-1,000 mg TM24 1 tablet p.o. with breakfast daily    pioglitazone (ACTOS) 30 mg tablet Take 1 Tablet by mouth daily.  glucose blood VI test strips (OneTouch Verio test strips) strip Check blood glucose twice daily before breakfast and bedtime    Blood-Glucose Meter (OneTouch Verio Meter) misc To check blood glucose    lancets misc To check blood glucose twice daily    nebivoloL (Bystolic) 10 mg tablet Take 10 mg by mouth daily.  valACYclovir (VALTREX) 500 mg tablet TAKE 1 TABLET BY MOUTH ONCE DAILY    colchicine 0.6 mg tablet TAKE 1 TABLET BY MOUTH ONCE DAILY    potassium chloride SR (KLOR-CON 10) 10 mEq tablet Take 1 Tab by mouth as needed.  (Patient not taking: Reported on 6/11/2021)    ondansetron (ZOFRAN ODT) 4 mg disintegrating tablet Take 1 Tab by mouth every eight (8) hours as needed for Nausea. (Patient not taking: Reported on 6/11/2021)    HYDROcodone-acetaminophen (NORCO) 5-325 mg per tablet Take 1 Tab by mouth every four (4) hours as needed for Pain. Max Daily Amount: 6 Tabs. (Patient not taking: Reported on 9/17/2021)    diclofenac (VOLTAREN) 1 % gel Apply 2 g to affected area four (4) times daily. (Patient not taking: Reported on 9/17/2021)    meloxicam (MOBIC) 7.5 mg tablet Take 1 Tab by mouth daily. (Patient not taking: Reported on 6/11/2021)     No current facility-administered medications for this visit. Allergies   Allergen Reactions    Apple Anaphylaxis    Duck [Poultry] Anaphylaxis     Duck eggs       Review of Systems:  Per HPI    Physical Examination:   Blood pressure 124/78, pulse (!) 59, temperature 97.6 °F (36.4 °C), temperature source Temporal, resp. rate 14, height 5' 5\" (1.651 m), weight 188 lb 8 oz (85.5 kg), SpO2 96 %. Estimated body mass index is 31.37 kg/m² as calculated from the following:    Height as of this encounter: 5' 5\" (1.651 m). -   Weight as of this encounter: 188 lb 8 oz (85.5 kg).   - General: pleasant, no distress, good eye contact  - HEENT: no pallor, no periorbital edema, EOMI  - Neck: supple, no thyromegaly,  - Cardiovascular: regular,  normal S1 and S2,   - Respiratory: clear to auscultation bilaterally  -   - Musculoskeletal: no edema  - Neurological: alert and oriented  -       Diabetic foot exam ; September 2021    H/o partial or complete amputation of foot : No  H/o previous foot ulceration : No  H/o pre - ulcerative callus : No  H/o peripheral neuropathy and callus : No  H/o poor circulation  : No  Foot deformity : None        Data Reviewed:        [x] Reviewed labs    Lab Results   Component Value Date/Time    Hemoglobin A1c 5.8 (H) 09/10/2021 12:00 AM    Hemoglobin A1c 10.9 (H) 04/30/2021 11:54 AM    Hemoglobin A1c, External 11.9 03/02/2021 12:00 AM    Glucose 91 09/10/2021 12:00 AM    Microalb/Creat ratio (ug/mg creat.) 3 09/10/2021 12:00 AM    LDL,Direct 143 (H) 09/10/2021 12:00 AM    Creatinine 1.59 (H) 09/10/2021 12:00 AM          Assessment/Plan:     1. Type 2 diabetes mellitus with hyperglycemia, without long-term current use of insulin ([facility]    2. Essential hypertension        1. Type 2 Diabetes Mellitus   Lab Results   Component Value Date/Time    Hemoglobin A1c 5.8 (H) 09/10/2021 12:00 AM    Hemoglobin A1c, External 11.9 03/02/2021 12:00 AM   Controlled, significant improvement due to lifestyle changes  Janumet, Actos 15 mg            3. Hyperlipidemia : Declined statin          4.nonmorbid obesity per medical criteria :Body mass index is 31.37 kg/m². Discussed about the importance of exercise and carbohydrate portion control. I have discussed the diagnosis with the patient and the intended plan as seen in the above orders. The patient has received an after-visit summary and questions were answered concerning future plans. I have discussed medication side effects . There are no Patient Instructions on file for this visit. Thank you for allowing me to participate in the care of this patient. Martha Ware MD      Patient verbalized understanding     Voice-recognition software was used to generate this report, which may result in some phonetic-based errors in the grammar and contents. Even though attempts were made to correct all the mistakes, some may have been missed and remained in the body of the report.

## 2022-01-19 LAB
BUN SERPL-MCNC: 24 MG/DL (ref 8–27)
BUN/CREAT SERPL: 14 (ref 10–24)
CALCIUM SERPL-MCNC: 10.9 MG/DL (ref 8.6–10.2)
CHLORIDE SERPL-SCNC: ABNORMAL MMOL/L
CO2 SERPL-SCNC: 20 MMOL/L (ref 20–29)
CREAT SERPL-MCNC: 1.72 MG/DL (ref 0.76–1.27)
EST. AVERAGE GLUCOSE BLD GHB EST-MCNC: 120 MG/DL
GLUCOSE SERPL-MCNC: 104 MG/DL (ref 65–99)
HBA1C MFR BLD: 5.8 % (ref 4.8–5.6)
INTERPRETATION: NORMAL
POTASSIUM SERPL-SCNC: ABNORMAL MMOL/L
SODIUM SERPL-SCNC: ABNORMAL MMOL/L

## 2022-01-21 ENCOUNTER — OFFICE VISIT (OUTPATIENT)
Dept: ENDOCRINOLOGY | Age: 62
End: 2022-01-21
Payer: COMMERCIAL

## 2022-01-21 VITALS
RESPIRATION RATE: 16 BRPM | TEMPERATURE: 97.2 F | HEART RATE: 67 BPM | DIASTOLIC BLOOD PRESSURE: 83 MMHG | HEIGHT: 65 IN | BODY MASS INDEX: 31.65 KG/M2 | WEIGHT: 190 LBS | SYSTOLIC BLOOD PRESSURE: 135 MMHG | OXYGEN SATURATION: 99 %

## 2022-01-21 DIAGNOSIS — E11.65 TYPE 2 DIABETES MELLITUS WITH HYPERGLYCEMIA, WITHOUT LONG-TERM CURRENT USE OF INSULIN (HCC): Primary | ICD-10-CM

## 2022-01-21 DIAGNOSIS — E83.52 HYPERCALCEMIA: ICD-10-CM

## 2022-01-21 DIAGNOSIS — I10 PRIMARY HYPERTENSION: ICD-10-CM

## 2022-01-21 PROCEDURE — 99214 OFFICE O/P EST MOD 30 MIN: CPT | Performed by: INTERNAL MEDICINE

## 2022-01-21 RX ORDER — ASCORBIC ACID 250 MG
250 TABLET ORAL DAILY
COMMUNITY

## 2022-01-21 NOTE — LETTER
1/22/2022    Patient: Ceferino Capellan   YOB: 1960   Date of Visit: 1/21/2022     Gladys Santos MD  [address] [facility]  [address] [facility] [address]  Via Fax: [phone]    Dear Gladys Santos MD,      Thank you for referring Mr. Bay Magdaleno to [id number] [place] [address] for evaluation. My notes for this consultation are attached. If you have questions, please do not hesitate to call me. I look forward to following your patient along with you.       Sincerely,    Teddy Solorio MD

## 2022-01-21 NOTE — PROGRESS NOTES
Jesse Caldera MD          Patient Information Name : Bonnie Escobar 57 y.o.   YOB: 1960         Referred by: Mabel Walters MD         Chief Complaint   Patient presents with    Diabetes       History of Present Illness: Bonnie Escobar is a 57 y.o. male here for follow-up    Diabetes mellitus was diagnosed in 2021 . End organ effects of diabetes: none. She is checking the blood glucose twice daily, fasting less than 120, postprandial less than 140  He has quit sodas, weight has been stable  Taking medications consistently  Physically active  He is taking Actos 15 mg  Vasovagal episode after the blood draw recently, no chest pain or shortness of breath  No chest pain,blurred vision  No history of pancreatitis    Wt Readings from Last 3 Encounters:   01/21/22 190 lb (86.2 kg)   09/17/21 188 lb 8 oz (85.5 kg)   06/11/21 185 lb (83.9 kg)       BP Readings from Last 3 Encounters:   01/21/22 135/83   09/17/21 124/78   06/11/21 126/75           Past Medical History:   Diagnosis Date    Diabetes ([facility]     Gout     Hypertension     Kidney stone     Type 2 diabetes mellitus (HCC)      Current Outpatient Medications   Medication Sig    ascorbic acid, vitamin C, (Vitamin C) 250 mg tablet Take  by mouth.  SITagliptin-metFORMIN (Janumet XR) 100-1,000 mg TM24 1 tablet p.o. with breakfast daily    glucose blood VI test strips (OneTouch Verio test strips) strip Check blood glucose twice daily before breakfast and bedtime    Blood-Glucose Meter (OneTouch Verio Meter) misc To check blood glucose    lancets misc To check blood glucose twice daily    colchicine 0.6 mg tablet TAKE 1 TABLET BY MOUTH ONCE DAILY    nebivoloL (Bystolic) 10 mg tablet Take 10 mg by mouth daily.  valACYclovir (VALTREX) 500 mg tablet TAKE 1 TABLET BY MOUTH ONCE DAILY     No current facility-administered medications for this visit.      Allergies   Allergen Reactions    Apple Anaphylaxis    Duck [Poultry] Anaphylaxis     Duck eggs       Review of Systems:  Per HPI    Physical Examination:   Blood pressure 135/83, pulse 67, temperature 97.2 °F (36.2 °C), temperature source Temporal, resp. rate 16, height 5' 5\" (1.651 m), weight 190 lb (86.2 kg), SpO2 99 %. Estimated body mass index is 31.62 kg/m² as calculated from the following:    Height as of this encounter: 5' 5\" (1.651 m). -   Weight as of this encounter: 190 lb (86.2 kg). - General: pleasant, no distress, good eye contact  - HEENT: no pallor, no periorbital edema, EOMI  - Neck: supple, no thyromegaly,  - Cardiovascular: regular,  normal S1 and S2,   - Respiratory: clear to auscultation bilaterally  -   - Musculoskeletal: no edema  - Neurological: alert and oriented  -       Diabetic foot exam ; September 2021    H/o partial or complete amputation of foot : No  H/o previous foot ulceration : No  H/o pre - ulcerative callus : No  H/o peripheral neuropathy and callus : No  H/o poor circulation  : No  Foot deformity : None        Data Reviewed:        [x] Reviewed labs    Lab Results   Component Value Date/Time    Hemoglobin A1c 5.8 (H) 01/14/2022 10:55 AM    Hemoglobin A1c 5.8 (H) 09/10/2021 12:00 AM    Hemoglobin A1c 10.9 (H) 04/30/2021 11:54 AM    Hemoglobin A1c, External 11.9 03/02/2021 12:00 AM    Glucose 104 (H) 01/14/2022 10:55 AM    Microalb/Creat ratio (ug/mg creat.) 3 09/10/2021 12:00 AM    LDL,Direct 143 (H) 09/10/2021 12:00 AM    Creatinine 1.72 (H) 01/14/2022 10:55 AM          Assessment/Plan:     1. Type 2 diabetes mellitus with hyperglycemia, without long-term current use of insulin ([address]    2. Primary hypertension        1.  Type 2 Diabetes Mellitus   Lab Results   Component Value Date/Time    Hemoglobin A1c 5.8 (H) 01/14/2022 10:55 AM    Hemoglobin A1c, External 11.9 03/02/2021 12:00 AM   Controlled, significant improvement due to lifestyle changes  Janumet, discontinue Actos 15 mg      Hypercalcemia: Hydration  Recheck    Kidney stone - type not known , seen Urologist   Last one was 2001    3. Hyperlipidemia : Declined statin          4.nonmorbid obesity per medical criteria :Body mass index is 31.62 kg/m². Discussed about the importance of exercise and carbohydrate portion control. I have discussed the diagnosis with the patient and the intended plan as seen in the above orders. The patient has received an after-visit summary and questions were answered concerning future plans. I have discussed medication side effects . There are no Patient Instructions on file for this visit. Follow-up and Dispositions    · Return in about 4 months (around 5/21/2022) for labs before next visit and follow up. Thank you for allowing me to participate in the care of this patient. Claudette Bunch, MD      Patient verbalized understanding     Voice-recognition software was used to generate this report, which may result in some phonetic-based errors in the grammar and contents. Even though attempts were made to correct all the mistakes, some may have been missed and remained in the body of the report.

## 2022-01-21 NOTE — PROGRESS NOTES
Lefty Sanchez is a 57 y.o. male here for   Chief Complaint   Patient presents with    Diabetes       1. Have you been to the ER, urgent care clinic since your last visit? Hospitalized since your last visit? -no    2. Have you seen or consulted any other health care providers outside of the [address] [place] [address] since your last visit?   Include any pap smears or colon screening.-no    Last week during labs BG was 98/57

## 2022-01-23 LAB
ANION GAP SERPL CALC-SCNC: 3 MMOL/L (ref 5–15)
BUN SERPL-MCNC: 21 MG/DL (ref 6–20)
BUN/CREAT SERPL: 13 (ref 12–20)
CALCIUM SERPL-MCNC: 10.1 MG/DL (ref 8.5–10.1)
CALCIUM SERPL-MCNC: 10.5 MG/DL (ref 8.5–10.1)
CHLORIDE SERPL-SCNC: 101 MMOL/L (ref 97–108)
CO2 SERPL-SCNC: 33 MMOL/L (ref 21–32)
CREAT SERPL-MCNC: 1.57 MG/DL (ref 0.7–1.3)
GLUCOSE SERPL-MCNC: 76 MG/DL (ref 65–100)
POTASSIUM SERPL-SCNC: 4.2 MMOL/L (ref 3.5–5.1)
PTH-INTACT SERPL-MCNC: 28.4 PG/ML (ref 18.4–88)
SODIUM SERPL-SCNC: 137 MMOL/L (ref 136–145)

## 2022-03-18 PROBLEM — I10 HYPERTENSION: Status: ACTIVE | Noted: 2021-05-25

## 2022-03-18 PROBLEM — K21.9 GERD (GASTROESOPHAGEAL REFLUX DISEASE): Status: ACTIVE | Noted: 2021-05-25

## 2022-03-20 PROBLEM — M10.9 GOUT: Status: ACTIVE | Noted: 2021-05-25

## 2022-03-20 PROBLEM — E11.65 TYPE 2 DIABETES MELLITUS WITH HYPERGLYCEMIA, WITHOUT LONG-TERM CURRENT USE OF INSULIN (HCC): Status: ACTIVE | Noted: 2021-05-25

## 2022-05-07 LAB
ALBUMIN/CREAT UR: <7 MG/G CREAT (ref 0–29)
CREAT UR-MCNC: 44.8 MG/DL
EST. AVERAGE GLUCOSE BLD GHB EST-MCNC: 134 MG/DL
HBA1C MFR BLD: 6.3 % (ref 4.8–5.6)
MICROALBUMIN UR-MCNC: <3 UG/ML

## 2022-05-12 PROBLEM — E87.6 HYPOKALEMIA: Status: ACTIVE | Noted: 2019-10-18

## 2022-05-12 PROBLEM — N18.30 STAGE 3 CHRONIC KIDNEY DISEASE (HCC): Status: ACTIVE | Noted: 2020-07-31

## 2022-05-20 ENCOUNTER — OFFICE VISIT (OUTPATIENT)
Dept: ENDOCRINOLOGY | Age: 62
End: 2022-05-20
Payer: COMMERCIAL

## 2022-05-20 VITALS
TEMPERATURE: 97.2 F | HEIGHT: 65 IN | SYSTOLIC BLOOD PRESSURE: 120 MMHG | BODY MASS INDEX: 32.21 KG/M2 | RESPIRATION RATE: 16 BRPM | HEART RATE: 61 BPM | DIASTOLIC BLOOD PRESSURE: 69 MMHG | OXYGEN SATURATION: 97 % | WEIGHT: 193.3 LBS

## 2022-05-20 DIAGNOSIS — I10 PRIMARY HYPERTENSION: ICD-10-CM

## 2022-05-20 DIAGNOSIS — E11.65 TYPE 2 DIABETES MELLITUS WITH HYPERGLYCEMIA, WITHOUT LONG-TERM CURRENT USE OF INSULIN (HCC): Primary | ICD-10-CM

## 2022-05-20 DIAGNOSIS — N18.30 STAGE 3 CHRONIC KIDNEY DISEASE, UNSPECIFIED WHETHER STAGE 3A OR 3B CKD (HCC): ICD-10-CM

## 2022-05-20 PROCEDURE — 99214 OFFICE O/P EST MOD 30 MIN: CPT | Performed by: INTERNAL MEDICINE

## 2022-05-20 PROCEDURE — 3044F HG A1C LEVEL LT 7.0%: CPT | Performed by: INTERNAL MEDICINE

## 2022-05-20 RX ORDER — LANCING DEVICE/LANCETS
KIT MISCELLANEOUS
Qty: 1 KIT | Refills: 0 | Status: SHIPPED | OUTPATIENT
Start: 2022-05-20

## 2022-05-20 NOTE — PROGRESS NOTES
Eriberto Fuchs MD          Patient Information Name : Argelia Luong 60 y.o.   YOB: 1960         Referred by: Geo Ba MD         Chief Complaint   Patient presents with    Diabetes       History of Present Illness: Argelia Luong is a 60 y.o. male here for follow-up    Diabetes mellitus was diagnosed in 2021 . He is on metformin, off Actos  Physically active  No chest pain,blurred vision  No history of pancreatitis    Wt Readings from Last 3 Encounters:   05/20/22 193 lb 4.8 oz (87.7 kg)   01/21/22 190 lb (86.2 kg)   09/17/21 188 lb 8 oz (85.5 kg)       BP Readings from Last 3 Encounters:   05/20/22 120/69   01/21/22 135/83   09/17/21 124/78           Past Medical History:   Diagnosis Date    [facility] ([facility]     Gout     Hypertension     Kidney stone     Type 2 diabetes mellitus (HCC)      Current Outpatient Medications   Medication Sig    ascorbic acid, vitamin C, (Vitamin C) 250 mg tablet Take  by mouth.  SITagliptin-metFORMIN (Janumet XR) 100-1,000 mg TM24 1 tablet p.o. with breakfast daily    glucose blood VI test strips (OneTouch Verio test strips) strip Check blood glucose twice daily before breakfast and bedtime    Blood-Glucose Meter (OneTouch Verio Meter) misc To check blood glucose    lancets misc To check blood glucose twice daily    colchicine 0.6 mg tablet TAKE 1 TABLET BY MOUTH ONCE DAILY    nebivoloL (Bystolic) 10 mg tablet Take 10 mg by mouth daily.  valACYclovir (VALTREX) 500 mg tablet TAKE 1 TABLET BY MOUTH ONCE DAILY     No current facility-administered medications for this visit. Allergies   Allergen Reactions    Apple Anaphylaxis    Duck [Poultry] Anaphylaxis     Duck eggs       Review of Systems:  Per HPI    Physical Examination:   Blood pressure 120/69, pulse 61, temperature 97.2 °F (36.2 °C), temperature source Temporal, resp.  rate 16, height 5' 5\" (1.651 m), weight 193 lb 4.8 oz (87.7 kg), SpO2 97 %. Estimated body mass index is 32.17 kg/m² as calculated from the following:    Height as of this encounter: 5' 5\" (1.651 m). -   Weight as of this encounter: 193 lb 4.8 oz (87.7 kg). - General: pleasant, no distress, good eye contact  - HEENT: no pallor, no periorbital edema, EOMI  - Neck: supple, no thyromegaly,  - Cardiovascular: regular,  normal S1 and S2,   - Respiratory: clear to auscultation bilaterally  -   - Musculoskeletal: no edema  - Neurological: alert and oriented  -       Diabetic foot exam ; September 2021    H/o partial or complete amputation of foot : No  H/o previous foot ulceration : No  H/o pre - ulcerative callus : No  H/o peripheral neuropathy and callus : No  H/o poor circulation  : No  Foot deformity : None        Data Reviewed:        [x] Reviewed labs    Lab Results   Component Value Date/Time    Hemoglobin A1c 6.3 (H) 05/06/2022 12:00 AM    Hemoglobin A1c 5.8 (H) 01/14/2022 10:55 AM    Hemoglobin A1c 5.8 (H) 09/10/2021 12:00 AM    Hemoglobin A1c, External 11.9 03/02/2021 12:00 AM    Glucose 76 01/21/2022 02:40 PM    Microalb/Creat ratio (ug/mg creat.) <7 05/06/2022 12:00 AM    LDL,Direct 143 (H) 09/10/2021 12:00 AM    Creatinine 1.57 (H) 01/21/2022 02:40 PM          Assessment/Plan:     1. Type 2 diabetes mellitus with hyperglycemia, without long-term current use of insulin (HCC)    2. Stage 3 chronic kidney disease, unspecified whether stage 3a or 3b CKD ([facility]    3. Primary hypertension        1. Type 2 Diabetes Mellitus   Lab Results   Component Value Date/Time    Hemoglobin A1c 6.3 (H) 05/06/2022 12:00 AM    Hemoglobin A1c, External 11.9 03/02/2021 12:00 AM   Controlled, significant improvement due to lifestyle changes  Janumet, discontinued Actos 15 mg Jan 2022       Hypercalcemia: Hydration  Monitor    Kidney stone - type not known , seen Urologist   Last one was 2001    3.  Hyperlipidemia : Declined statin          4.nonmorbid obesity per medical criteria :Body mass index is 32.17 kg/m². Discussed about the importance of exercise and carbohydrate portion control. I have discussed the diagnosis with the patient and the intended plan as seen in the above orders. The patient has received an after-visit summary and questions were answered concerning future plans. I have discussed medication side effects . There are no Patient Instructions on file for this visit. Thank you for allowing me to participate in the care of this patient. Elyssa Torres MD      Patient verbalized understanding     Voice-recognition software was used to generate this report, which may result in some phonetic-based errors in the grammar and contents. Even though attempts were made to correct all the mistakes, some may have been missed and remained in the body of the report.

## 2022-05-20 NOTE — PROGRESS NOTES
Dhaval Fuentes is a 60 y.o. male here for   Chief Complaint   Patient presents with    Diabetes       1. Have you been to the ER, urgent care clinic since your last visit? Hospitalized since your last visit? -no    2. Have you seen or consulted any other health care providers outside of the [address] [place] [address] since your last visit?   Include any pap smears or colon screening.-no

## 2022-05-20 NOTE — LETTER
5/21/2022    Patient: Brit Fairchild   YOB: 1960   Date of Visit: 5/20/2022     Lauren Draper MD  [address] [address]  Via Fax: [phone]    Dear Lauren Draper MD,      Thank you for referring Mr. Ladan Prado to [address] [place] [address] for evaluation. My notes for this consultation are attached. If you have questions, please do not hesitate to call me. I look forward to following your patient along with you.       Sincerely,    Marcell Munguia MD

## 2022-09-09 ENCOUNTER — OFFICE VISIT (OUTPATIENT)
Dept: ENDOCRINOLOGY | Age: 62
End: 2022-09-09
Payer: COMMERCIAL

## 2022-09-09 VITALS
HEIGHT: 65 IN | WEIGHT: 191 LBS | DIASTOLIC BLOOD PRESSURE: 88 MMHG | BODY MASS INDEX: 31.82 KG/M2 | OXYGEN SATURATION: 97 % | SYSTOLIC BLOOD PRESSURE: 125 MMHG | HEART RATE: 65 BPM | TEMPERATURE: 97.2 F | RESPIRATION RATE: 16 BRPM

## 2022-09-09 DIAGNOSIS — N18.30 STAGE 3 CHRONIC KIDNEY DISEASE, UNSPECIFIED WHETHER STAGE 3A OR 3B CKD (HCC): ICD-10-CM

## 2022-09-09 DIAGNOSIS — I10 ESSENTIAL HYPERTENSION: ICD-10-CM

## 2022-09-09 DIAGNOSIS — E11.65 TYPE 2 DIABETES MELLITUS WITH HYPERGLYCEMIA, UNSPECIFIED WHETHER LONG TERM INSULIN USE (HCC): Primary | ICD-10-CM

## 2022-09-09 PROCEDURE — 3044F HG A1C LEVEL LT 7.0%: CPT | Performed by: INTERNAL MEDICINE

## 2022-09-09 PROCEDURE — 99214 OFFICE O/P EST MOD 30 MIN: CPT | Performed by: INTERNAL MEDICINE

## 2022-09-09 RX ORDER — DAPAGLIFLOZIN AND METFORMIN HYDROCHLORIDE 10; 1000 MG/1; MG/1
1 TABLET, FILM COATED, EXTENDED RELEASE ORAL DAILY
Qty: 90 EACH | Refills: 3 | Status: SHIPPED | OUTPATIENT
Start: 2022-09-09

## 2022-09-09 RX ORDER — CEFUROXIME AXETIL 250 MG/1
TABLET ORAL
COMMUNITY
Start: 2022-09-02

## 2022-09-09 NOTE — PROGRESS NOTES
Christiano Lopez is a 60 y.o. male here for   Chief Complaint   Patient presents with    Diabetes       1. Have you been to the ER, urgent care clinic since your last visit? Hospitalized since your last visit? -no    2. Have you seen or consulted any other health care providers outside of the [address] [place] [address] since your last visit?   Include any pap smears or colon screening.-[facility]

## 2022-09-09 NOTE — PROGRESS NOTES
Christa Bourgeois MD          Patient Information Name : Masoud Weathers 60 y.o.   YOB: 1960         Referred by: Rodger Trevizo MD         Chief Complaint   Patient presents with    Diabetes       History of Present Illness: Masoud Weathers is a 60 y.o. male here for follow-up    Diabetes mellitus was diagnosed in 2021 . On oral medications  Physically active  CKD due to nephrolithiasis  No chest pain,blurred vision  No history of pancreatitis    Wt Readings from Last 3 Encounters:   09/09/22 191 lb (86.6 kg)   05/20/22 193 lb 4.8 oz (87.7 kg)   01/21/22 190 lb (86.2 kg)       BP Readings from Last 3 Encounters:   09/09/22 125/88   05/20/22 120/69   01/21/22 135/83           Past Medical History:   Diagnosis Date    Diabetes ([facility]     Gout     Hypertension     Kidney stone     Type 2 diabetes mellitus (HCC)      Current Outpatient Medications   Medication Sig    cefUROXime (CEFTIN) 250 mg tablet     SITagliptin-metFORMIN (Janumet XR) 100-1,000 mg TM24 Take 1 tablet by mouth once daily with breakfast    Lancing Device with Lancets (OneTouch Delica Plus Lanc Dev) kit Check blood glucose twice daily before breakfast and bedtime Dx Code: E11.65    ascorbic acid, vitamin C, (VITAMIN C) 250 mg tablet Take 250 mg by mouth daily. glucose blood VI test strips (OneTouch Verio test strips) strip Check blood glucose twice daily before breakfast and bedtime    Blood-Glucose Meter (OneTouch Verio Meter) misc To check blood glucose    lancets misc To check blood glucose twice daily    colchicine 0.6 mg tablet TAKE 1 TABLET BY MOUTH ONCE DAILY    nebivoloL (BYSTOLIC) 10 mg tablet Take 10 mg by mouth daily. valACYclovir (VALTREX) 500 mg tablet TAKE 1 TABLET BY MOUTH ONCE DAILY     No current facility-administered medications for this visit.      Allergies   Allergen Reactions    Apple Anaphylaxis    Duck [Poultry] Anaphylaxis     Duck eggs Review of Systems:  Per HPI    Physical Examination:   Blood pressure 125/88, pulse 65, temperature 97.2 °F (36.2 °C), temperature source Temporal, resp. rate 16, height 5' 5\" (1.651 m), weight 191 lb (86.6 kg), SpO2 97 %. Estimated body mass index is 31.78 kg/m² as calculated from the following:    Height as of this encounter: 5' 5\" (1.651 m). Weight as of this encounter: 191 lb (86.6 kg). General: pleasant, no distress, good eye contact  HEENT: no pallor, no periorbital edema, EOMI  Neck: supple, no thyromegaly,  Cardiovascular: regular,  normal S1 and S2,   Respiratory: clear to auscultation bilaterally    Musculoskeletal: no edema  Neurological: alert and oriented        Diabetic foot exam ; September 2021    H/o partial or complete amputation of foot : No  H/o previous foot ulceration : No  H/o pre - ulcerative callus : No  H/o peripheral neuropathy and callus : No  H/o poor circulation  : No  Foot deformity : None        Data Reviewed:        [x] Reviewed labs    Lab Results   Component Value Date/Time    Hemoglobin A1c 6.2 (H) 08/29/2022 09:45 AM    Hemoglobin A1c 6.3 (H) 05/06/2022 12:00 AM    Hemoglobin A1c 5.8 (H) 01/14/2022 10:55 AM    Hemoglobin A1c, External 11.9 03/02/2021 12:00 AM    Glucose 113 (H) 08/29/2022 09:45 AM    Microalb/Creat ratio (ug/mg creat.) <7 05/06/2022 12:00 AM    LDL,Direct 143 (H) 09/10/2021 12:00 AM    Creatinine 1.78 (H) 08/29/2022 09:45 AM          Assessment/Plan:     1. Type 2 diabetes mellitus with hyperglycemia, unspecified whether long term insulin use ([address]    2. Essential hypertension        1. Type 2 Diabetes Mellitus   Lab Results   Component Value Date/Time    Hemoglobin A1c 6.2 (H) 08/29/2022 09:45 AM    Hemoglobin A1c, External 11.9 03/02/2021 12:00 AM   Controlled  Xigduo, discontinued Actos 15 mg Jan 2022   Side effects discussed    2. CKD  ARB    Kidney stone - type not known , seen Urologist   Last one was 2001    3.  Hyperlipidemia : Declined statin          4.nonmorbid obesity per medical criteria :Body mass index is 31.78 kg/m². Discussed about the importance of exercise and carbohydrate portion control. I have discussed the diagnosis with the patient and the intended plan as seen in the above orders. The patient has received an after-visit summary and questions were answered concerning future plans. I have discussed medication side effects . There are no Patient Instructions on file for this visit. Thank you for allowing me to participate in the care of this patient. Turner Russo MD      Patient verbalized understanding     Voice-recognition software was used to generate this report, which may result in some phonetic-based errors in the grammar and contents. Even though attempts were made to correct all the mistakes, some may have been missed and remained in the body of the report.

## 2022-09-09 NOTE — LETTER
9/9/2022    Patient: Mason Agustin   YOB: 1960   Date of Visit: 9/9/2022     Cassidy Sotomayor MD  [address] [facility]  [address] [id number]  Via Fax: [phone]    Dear Cassidy Sotomayor MD,      Thank you for referring Mr. Tyson Dominguez to [address] [place] [address] for evaluation. My notes for this consultation are attached. If you have questions, please do not hesitate to call me. I look forward to following your patient along with you.       Sincerely,    Shira Childs MD

## 2023-09-01 ENCOUNTER — NURSE ONLY (OUTPATIENT)
Age: 63
End: 2023-09-01

## 2023-09-01 DIAGNOSIS — E11.65 TYPE 2 DIABETES MELLITUS WITH HYPERGLYCEMIA, WITHOUT LONG-TERM CURRENT USE OF INSULIN (HCC): Primary | ICD-10-CM

## 2023-09-02 LAB
ANION GAP SERPL CALC-SCNC: 5 MMOL/L (ref 5–15)
BUN SERPL-MCNC: 26 MG/DL (ref 6–20)
BUN/CREAT SERPL: 15 (ref 12–20)
CALCIUM SERPL-MCNC: 9.9 MG/DL (ref 8.5–10.1)
CHLORIDE SERPL-SCNC: 104 MMOL/L (ref 97–108)
CO2 SERPL-SCNC: 29 MMOL/L (ref 21–32)
CREAT SERPL-MCNC: 1.79 MG/DL (ref 0.7–1.3)
CREAT UR-MCNC: 98.1 MG/DL
EST. AVERAGE GLUCOSE BLD GHB EST-MCNC: 137 MG/DL
GLUCOSE SERPL-MCNC: 106 MG/DL (ref 65–100)
HBA1C MFR BLD: 6.4 % (ref 4–5.6)
LDLC SERPL DIRECT ASSAY-MCNC: 108 MG/DL (ref 0–100)
MICROALBUMIN UR-MCNC: 3.26 MG/DL
MICROALBUMIN/CREAT UR-RTO: 33 MG/G (ref 0–30)
POTASSIUM SERPL-SCNC: 4.4 MMOL/L (ref 3.5–5.1)
SODIUM SERPL-SCNC: 138 MMOL/L (ref 136–145)

## 2023-09-08 ENCOUNTER — OFFICE VISIT (OUTPATIENT)
Age: 63
End: 2023-09-08
Payer: COMMERCIAL

## 2023-09-08 VITALS
HEART RATE: 61 BPM | OXYGEN SATURATION: 97 % | TEMPERATURE: 97.7 F | SYSTOLIC BLOOD PRESSURE: 133 MMHG | DIASTOLIC BLOOD PRESSURE: 78 MMHG | WEIGHT: 192.2 LBS | RESPIRATION RATE: 18 BRPM | HEIGHT: 65 IN | BODY MASS INDEX: 32.02 KG/M2

## 2023-09-08 DIAGNOSIS — E11.65 TYPE 2 DIABETES MELLITUS WITH HYPERGLYCEMIA, WITHOUT LONG-TERM CURRENT USE OF INSULIN (HCC): Primary | ICD-10-CM

## 2023-09-08 DIAGNOSIS — N18.30 STAGE 3 CHRONIC KIDNEY DISEASE, UNSPECIFIED WHETHER STAGE 3A OR 3B CKD (HCC): ICD-10-CM

## 2023-09-08 DIAGNOSIS — I10 PRIMARY HYPERTENSION: ICD-10-CM

## 2023-09-08 PROCEDURE — 3075F SYST BP GE 130 - 139MM HG: CPT | Performed by: INTERNAL MEDICINE

## 2023-09-08 PROCEDURE — 99214 OFFICE O/P EST MOD 30 MIN: CPT | Performed by: INTERNAL MEDICINE

## 2023-09-08 PROCEDURE — 3078F DIAST BP <80 MM HG: CPT | Performed by: INTERNAL MEDICINE

## 2023-09-08 PROCEDURE — 3044F HG A1C LEVEL LT 7.0%: CPT | Performed by: INTERNAL MEDICINE

## 2023-09-08 RX ORDER — BLOOD SUGAR DIAGNOSTIC
STRIP MISCELLANEOUS
COMMUNITY
Start: 2021-05-01

## 2023-09-08 RX ORDER — TADALAFIL 10 MG/1
TABLET ORAL
COMMUNITY
Start: 2023-06-19

## 2023-09-08 RX ORDER — LOSARTAN POTASSIUM 25 MG/1
25 TABLET ORAL DAILY
Qty: 90 TABLET | Refills: 1 | Status: SHIPPED | OUTPATIENT
Start: 2023-09-08

## 2023-09-08 RX ORDER — BLOOD-GLUCOSE METER
EACH MISCELLANEOUS
Qty: 1 KIT | Refills: 0 | Status: SHIPPED | OUTPATIENT
Start: 2023-09-08

## 2023-09-08 NOTE — PROGRESS NOTES
Nilda  is a 58 y.o. male here for   Chief Complaint   Patient presents with    Diabetes       1. Have you been to the ER, urgent care clinic since your last visit? Hospitalized since your last visit? - no    2. Have you seen or consulted any other health care providers outside of the [address] [place] [address] Avenue since your last visit?   Include any pap smears or colon screening.-[facility], [facility], [facility]

## 2023-09-08 NOTE — PROGRESS NOTES
Bentley Garcia MD               Patient Information Name : Daphne Tee  60 y.o.   YOB: 1960           Referred by: Nohemi Charles MD             Diabetes mellitus follow-up         History of Present Illness: Daphne Tee is a  60 y.o. male here for follow-up      Diabetes mellitus was diagnosed in 2021 . On oral medications, travels a lot   Taking medications consistently  On losartan  CKD due to nephrolithiasis   No chest pain,blurred vision  No history of pancreatitis            Wt Readings from Last 3 Encounters:   09/08/23 192 lb 3.2 oz (87.2 kg)   09/09/22 191 lb (86.6 kg)   05/20/22 193 lb 4.8 oz (87.7 kg)        Past Medical History:   Diagnosis Date    Diabetes ([address] [facility])     Gout     Hypertension     Kidney stone     Type 2 diabetes mellitus (HCC)        Current Outpatient Medications   Medication Sig    tadalafil (CIALIS) 10 MG tablet TAKE 1 TABLET BY MOUTH AS NEEDED . DO NOT EXCEED 1 PER 24 HOURS    losartan (COZAAR) 25 MG tablet Take 1 tablet by mouth daily    Lancets MISC To check blood glucose twice daily    ascorbic acid (VITAMIN C) 250 MG tablet Take 1 tablet by mouth daily    colchicine (COLCRYS) 0.6 MG tablet Take 1 tablet by mouth daily    Dapagliflozin-metFORMIN HCl ER (XIGDUO XR)  MG TB24 Take 1 tablet by mouth daily    nebivolol (BYSTOLIC) 10 MG tablet Take 1 tablet by mouth daily    valACYclovir (VALTREX) 500 MG tablet Take 1 tablet by mouth daily    blood glucose test strips (ONETOUCH VERIO) strip Check blood glucose twice daily before breakfast and bedtime (Patient not taking: Reported on 9/8/2023)     No current facility-administered medications for this visit.            Physical Examination:      General: pleasant, no distress, good eye contact    HEENT: no pallor, no periorbital edema, EOMI    Neck: supple, no thyromegaly,    Cardiovascular: regular,  normal S1 and S2,     Respiratory:

## 2023-10-16 RX ORDER — DAPAGLIFLOZIN AND METFORMIN HYDROCHLORIDE 10; 1000 MG/1; MG/1
TABLET, FILM COATED, EXTENDED RELEASE ORAL
Qty: 90 TABLET | Refills: 0 | Status: SHIPPED | OUTPATIENT
Start: 2023-10-16

## 2023-12-15 ENCOUNTER — TELEPHONE (OUTPATIENT)
Age: 63
End: 2023-12-15

## 2023-12-15 DIAGNOSIS — E11.65 TYPE 2 DIABETES MELLITUS WITH HYPERGLYCEMIA, WITHOUT LONG-TERM CURRENT USE OF INSULIN (HCC): Primary | ICD-10-CM

## 2024-01-04 ENCOUNTER — OFFICE VISIT (OUTPATIENT)
Age: 64
End: 2024-01-04
Payer: COMMERCIAL

## 2024-01-04 VITALS
RESPIRATION RATE: 17 BRPM | TEMPERATURE: 97 F | WEIGHT: 192.4 LBS | BODY MASS INDEX: 32.06 KG/M2 | DIASTOLIC BLOOD PRESSURE: 83 MMHG | HEART RATE: 68 BPM | OXYGEN SATURATION: 96 % | HEIGHT: 65 IN | SYSTOLIC BLOOD PRESSURE: 137 MMHG

## 2024-01-04 DIAGNOSIS — N18.30 STAGE 3 CHRONIC KIDNEY DISEASE, UNSPECIFIED WHETHER STAGE 3A OR 3B CKD (HCC): ICD-10-CM

## 2024-01-04 DIAGNOSIS — E11.65 TYPE 2 DIABETES MELLITUS WITH HYPERGLYCEMIA, WITHOUT LONG-TERM CURRENT USE OF INSULIN (HCC): Primary | ICD-10-CM

## 2024-01-04 DIAGNOSIS — I10 PRIMARY HYPERTENSION: ICD-10-CM

## 2024-01-04 DIAGNOSIS — E11.65 TYPE 2 DIABETES MELLITUS WITH HYPERGLYCEMIA, WITHOUT LONG-TERM CURRENT USE OF INSULIN (HCC): ICD-10-CM

## 2024-01-04 PROCEDURE — 99214 OFFICE O/P EST MOD 30 MIN: CPT | Performed by: INTERNAL MEDICINE

## 2024-01-04 PROCEDURE — 3079F DIAST BP 80-89 MM HG: CPT | Performed by: INTERNAL MEDICINE

## 2024-01-04 PROCEDURE — 3075F SYST BP GE 130 - 139MM HG: CPT | Performed by: INTERNAL MEDICINE

## 2024-01-04 RX ORDER — BLOOD SUGAR DIAGNOSTIC
STRIP MISCELLANEOUS
Qty: 100 EACH | Refills: 3 | Status: SHIPPED | OUTPATIENT
Start: 2024-01-04

## 2024-01-04 RX ORDER — ROSUVASTATIN CALCIUM 20 MG/1
20 TABLET, COATED ORAL NIGHTLY
Qty: 30 TABLET | Refills: 3 | Status: SHIPPED | OUTPATIENT
Start: 2024-01-04 | End: 2024-01-04 | Stop reason: SDUPTHER

## 2024-01-04 RX ORDER — ROSUVASTATIN CALCIUM 20 MG/1
20 TABLET, COATED ORAL NIGHTLY
Qty: 90 TABLET | Refills: 3 | Status: SHIPPED | OUTPATIENT
Start: 2024-01-04

## 2024-01-04 RX ORDER — LOSARTAN POTASSIUM 25 MG/1
25 TABLET ORAL DAILY
Qty: 90 TABLET | Refills: 3 | Status: SHIPPED | OUTPATIENT
Start: 2024-01-04

## 2024-01-04 RX ORDER — DAPAGLIFLOZIN AND METFORMIN HYDROCHLORIDE 10; 1000 MG/1; MG/1
TABLET, FILM COATED, EXTENDED RELEASE ORAL
Qty: 90 TABLET | Refills: 3 | Status: SHIPPED | OUTPATIENT
Start: 2024-01-04

## 2024-01-04 NOTE — PROGRESS NOTES
Lasha Fieldsgustavoamador is a 63 y.o. male here for   Chief Complaint   Patient presents with    Diabetes       1. Have you been to the ER, urgent care clinic since your last visit?  Hospitalized since your last visit? - no    2. Have you seen or consulted any other health care providers outside of the [facility] since your last visit?  Include any pap smears or colon screening.- no

## 2024-01-04 NOTE — PROGRESS NOTES
[facility] DIABETES AND ENDOCRINOLOGY                 Cris Osuna MD               Patient Information Name : Lasha Lamas  62 y.o.   YOB: 1960           Referred by: Bess Talley MD             Diabetes mellitus follow-up         History of Present Illness: Lasha Lamas is a  62 y.o. male here for follow-up      Diabetes mellitus was diagnosed in 2021 .   On oral medications, travels a lot, A1c increased  No blood glucose log     Taking medications consistently  On losartan  CKD due to nephrolithiasis   No chest pain,blurred vision  No history of pancreatitis            Wt Readings from Last 3 Encounters:   01/04/24 87.3 kg (192 lb 6.4 oz)   09/08/23 87.2 kg (192 lb 3.2 oz)   09/09/22 86.6 kg (191 lb)        Past Medical History:   Diagnosis Date    Diabetes (HCC)     Gout     Hypertension     Kidney stone     Type 2 diabetes mellitus (HCC)        Current Outpatient Medications   Medication Sig    XIGDUO XR  MG TB24 TAKE 1 TABLET BY MOUTH ONCE DAILY STOP  JANUMET    blood glucose test strips (ONETOUCH VERIO) strip     tadalafil (CIALIS) 10 MG tablet TAKE 1 TABLET BY MOUTH AS NEEDED . DO NOT EXCEED 1 PER 24 HOURS    losartan (COZAAR) 25 MG tablet Take 1 tablet by mouth daily    Blood Glucose Monitoring Suppl (ONETOUCH VERIO) w/Device KIT Use to check BG once daily Dx Code- E11.65    Lancets MISC To check blood glucose twice daily    ascorbic acid (VITAMIN C) 250 MG tablet Take 1 tablet by mouth daily    colchicine (COLCRYS) 0.6 MG tablet Take 1 tablet by mouth daily    nebivolol (BYSTOLIC) 10 MG tablet Take 1 tablet by mouth daily    valACYclovir (VALTREX) 500 MG tablet Take 1 tablet by mouth daily     No current facility-administered medications for this visit.           Physical Examination:      General: pleasant, no distress, good eye contact    HEENT: no pallor, no periorbital edema, EOMI    Neck: supple, no thyromegaly,    Cardiovascular: regular,  normal

## 2024-05-17 ENCOUNTER — NURSE ONLY (OUTPATIENT)
Age: 64
End: 2024-05-17

## 2024-05-17 DIAGNOSIS — I10 PRIMARY HYPERTENSION: ICD-10-CM

## 2024-05-17 DIAGNOSIS — E11.65 TYPE 2 DIABETES MELLITUS WITH HYPERGLYCEMIA, WITHOUT LONG-TERM CURRENT USE OF INSULIN (HCC): ICD-10-CM

## 2024-05-17 LAB
EST. AVERAGE GLUCOSE BLD GHB EST-MCNC: 140 MG/DL
HBA1C MFR BLD: 6.5 % (ref 4–5.6)
LDLC SERPL DIRECT ASSAY-MCNC: 124 MG/DL (ref 0–100)

## 2024-05-24 ENCOUNTER — OFFICE VISIT (OUTPATIENT)
Age: 64
End: 2024-05-24
Payer: COMMERCIAL

## 2024-05-24 VITALS
RESPIRATION RATE: 17 BRPM | TEMPERATURE: 97.3 F | HEIGHT: 65 IN | BODY MASS INDEX: 32.02 KG/M2 | SYSTOLIC BLOOD PRESSURE: 113 MMHG | HEART RATE: 66 BPM | OXYGEN SATURATION: 95 % | DIASTOLIC BLOOD PRESSURE: 75 MMHG

## 2024-05-24 DIAGNOSIS — E11.65 TYPE 2 DIABETES MELLITUS WITH HYPERGLYCEMIA, WITHOUT LONG-TERM CURRENT USE OF INSULIN (HCC): Primary | ICD-10-CM

## 2024-05-24 DIAGNOSIS — N18.30 STAGE 3 CHRONIC KIDNEY DISEASE, UNSPECIFIED WHETHER STAGE 3A OR 3B CKD (HCC): ICD-10-CM

## 2024-05-24 DIAGNOSIS — I10 PRIMARY HYPERTENSION: ICD-10-CM

## 2024-05-24 PROCEDURE — 3078F DIAST BP <80 MM HG: CPT | Performed by: INTERNAL MEDICINE

## 2024-05-24 PROCEDURE — 1123F ACP DISCUSS/DSCN MKR DOCD: CPT | Performed by: INTERNAL MEDICINE

## 2024-05-24 PROCEDURE — 3044F HG A1C LEVEL LT 7.0%: CPT | Performed by: INTERNAL MEDICINE

## 2024-05-24 PROCEDURE — 3074F SYST BP LT 130 MM HG: CPT | Performed by: INTERNAL MEDICINE

## 2024-05-24 PROCEDURE — 99214 OFFICE O/P EST MOD 30 MIN: CPT | Performed by: INTERNAL MEDICINE

## 2024-05-24 NOTE — PROGRESS NOTES
Rm    Chief Complaint   Patient presents with    3 Month Follow-Up     Type 2 diabetes        /75 (Site: Left Upper Arm)   Pulse 66   Temp 97.3 °F (36.3 °C)   Resp 17   Ht 1.651 m (5' 5\")   SpO2 95%   BMI 32.02 kg/m²      1. Have you been to the ER, urgent care clinic since your last visit?  Hospitalized since your last visit? No     2. Have you seen or consulted any other health care providers outside of the [facility] since your last visit?  Include any pap smears or colon screening. No     Health Maintenance Due   Topic Date Due    COVID-19 Vaccine (1) Never done    Pneumococcal 65+ years Vaccine (1 of 2 - PCV) Never done    Depression Screen  Never done    HIV screen  Never done    Diabetic retinal exam  Never done    Hepatitis C screen  Never done    DTaP/Tdap/Td vaccine (1 - Tdap) Never done    Colorectal Cancer Screen  Never done    Shingles vaccine (1 of 2) Never done    Respiratory Syncytial Virus (RSV) Pregnant or age 60 yrs+ (1 - 1-dose 60+ series) Never done    Diabetic foot exam  09/18/2022             No data to display                 Failed to redirect to the Timeline version of the [employer].    Failed to redirect to the Timeline version of the [employer].

## 2024-05-24 NOTE — PROGRESS NOTES
[facility] DIABETES AND ENDOCRINOLOGY                 Cris Osuna MD               Patient Information Name : Lasha Lamas  62 y.o.   YOB: 1960           Referred by: Bess Talley MD             Diabetes mellitus follow-up         History of Present Illness: Lasha Lamas is here for follow-up      Diabetes mellitus was diagnosed in 2021 .   On oral medications, travels a lot,  Not able to tolerate Crestor 20 mg  Not on Crestor for 2 months      CKD due to nephrolithiasis   No chest pain,blurred vision  No history of pancreatitis                 Past Medical History:   Diagnosis Date    Diabetes (HCC)     Gout     Hypertension     Kidney stone     Type 2 diabetes mellitus (HCC)        Current Outpatient Medications   Medication Sig    losartan (COZAAR) 25 MG tablet Take 1 tablet by mouth daily    rosuvastatin (CRESTOR) 20 MG tablet Take 1 tablet by mouth nightly    Dapagliflozin Pro-metFORMIN ER (XIGDUO XR)  MG TB24 TAKE 1 TABLET BY MOUTH ONCE DAILY STOP  JANUMET    blood glucose test strips (ONETOUCH VERIO) strip Use to check BG daily. Dx code E11.65    tadalafil (CIALIS) 10 MG tablet TAKE 1 TABLET BY MOUTH AS NEEDED . DO NOT EXCEED 1 PER 24 HOURS    Blood Glucose Monitoring Suppl (ONETOUCH VERIO) w/Device KIT Use to check BG once daily Dx Code- E11.65    Lancets MISC To check blood glucose twice daily    ascorbic acid (VITAMIN C) 250 MG tablet Take 1 tablet by mouth daily    colchicine (COLCRYS) 0.6 MG tablet Take 1 tablet by mouth daily    nebivolol (BYSTOLIC) 10 MG tablet Take 1 tablet by mouth daily    valACYclovir (VALTREX) 500 MG tablet Take 1 tablet by mouth daily     No current facility-administered medications for this visit.           Physical Examination:      General: pleasant, no distress, good eye contact    HEENT: no pallor, no periorbital edema, EOMI    Neck: supple, no thyromegaly,    Cardiovascular: regular,  normal S1 and S2,

## 2024-07-26 LAB — HBA1C MFR BLD HPLC: 7.7 %

## 2024-09-30 ENCOUNTER — TELEPHONE (OUTPATIENT)
Age: 64
End: 2024-09-30

## 2024-09-30 NOTE — TELEPHONE ENCOUNTER
Kidney doctor advised patient to stop Janumet although had xigduo as current medication (did not discuss) Patient states he had severe rash and was advised to contact dr. Thao harrington for another medication. Please review

## 2024-10-01 DIAGNOSIS — E11.65 TYPE 2 DIABETES MELLITUS WITH HYPERGLYCEMIA, WITHOUT LONG-TERM CURRENT USE OF INSULIN (HCC): Primary | ICD-10-CM

## 2024-10-01 RX ORDER — PIOGLITAZONEHYDROCHLORIDE 15 MG/1
15 TABLET ORAL DAILY
Qty: 30 TABLET | Refills: 3 | Status: SHIPPED | OUTPATIENT
Start: 2024-10-01

## 2024-10-01 NOTE — TELEPHONE ENCOUNTER
Need more information  Agree he is not on Janumet, it was discontinued in the past   he is on Xigduo which is Farxiga plus metformin lower dose (renally adjusted dose)

## 2024-10-01 NOTE — TELEPHONE ENCOUNTER
Pt saw Dr. Maude Sow, nephrologist phone number [phone]. Pt has had a rash al summer, thought it was a heat rash. Per Dr. Sow \"white pill\" is causing it. Pt is to have several labs done this week ( renal function panel, random urine, total protein, vit D, PTH, Calcium and aldosterone?). Renal US was also ordered. Pt stated he was told he's at 40% and at stage 3B kidney disease.

## 2024-10-01 NOTE — TELEPHONE ENCOUNTER
Called and left a message to call back  Not many good options available    He was on pioglitazone 15 mg in the morning, can cause swelling in the ankles  We can try that temporarily after stopping Xigduo  If the sugars are still high glipizide 5 mg twice daily with meals, watch for low blood glucose      If the rash does not go away then it is better to go back to Xigduo 1 tablet as it is good for the kidneys.

## 2024-10-11 ENCOUNTER — HOSPITAL ENCOUNTER (OUTPATIENT)
Facility: HOSPITAL | Age: 64
Discharge: HOME | End: 2024-10-14
Attending: INTERNAL MEDICINE
Payer: COMMERCIAL

## 2024-10-11 DIAGNOSIS — N18.32 STAGE 3B CHRONIC KIDNEY DISEASE (HCC): ICD-10-CM

## 2024-10-11 PROCEDURE — 76770 US EXAM ABDO BACK WALL COMP: CPT

## 2024-10-18 ENCOUNTER — LAB (OUTPATIENT)
Age: 64
End: 2024-10-18

## 2024-10-18 DIAGNOSIS — E11.65 TYPE 2 DIABETES MELLITUS WITH HYPERGLYCEMIA, WITHOUT LONG-TERM CURRENT USE OF INSULIN (HCC): ICD-10-CM

## 2024-10-18 LAB
ANION GAP SERPL CALC-SCNC: 5 MMOL/L (ref 2–12)
BUN SERPL-MCNC: 24 MG/DL (ref 6–20)
BUN/CREAT SERPL: 15 (ref 12–20)
CALCIUM SERPL-MCNC: 9.8 MG/DL (ref 8.5–10.1)
CHLORIDE SERPL-SCNC: 102 MMOL/L (ref 97–108)
CO2 SERPL-SCNC: 31 MMOL/L (ref 21–32)
CREAT SERPL-MCNC: 1.63 MG/DL (ref 0.7–1.3)
EST. AVERAGE GLUCOSE BLD GHB EST-MCNC: 157 MG/DL
GLUCOSE SERPL-MCNC: 126 MG/DL (ref 65–100)
HBA1C MFR BLD: 7.1 % (ref 4–5.6)
POTASSIUM SERPL-SCNC: 4.4 MMOL/L (ref 3.5–5.1)
SODIUM SERPL-SCNC: 138 MMOL/L (ref 136–145)

## 2024-10-25 ENCOUNTER — OFFICE VISIT (OUTPATIENT)
Age: 64
End: 2024-10-25

## 2024-10-25 VITALS
TEMPERATURE: 97.7 F | OXYGEN SATURATION: 98 % | SYSTOLIC BLOOD PRESSURE: 111 MMHG | HEIGHT: 65 IN | DIASTOLIC BLOOD PRESSURE: 58 MMHG | HEART RATE: 58 BPM | BODY MASS INDEX: 31.32 KG/M2 | WEIGHT: 188 LBS

## 2024-10-25 DIAGNOSIS — I10 PRIMARY HYPERTENSION: ICD-10-CM

## 2024-10-25 DIAGNOSIS — N18.30 STAGE 3 CHRONIC KIDNEY DISEASE, UNSPECIFIED WHETHER STAGE 3A OR 3B CKD (HCC): ICD-10-CM

## 2024-10-25 DIAGNOSIS — E11.65 TYPE 2 DIABETES MELLITUS WITH HYPERGLYCEMIA, WITHOUT LONG-TERM CURRENT USE OF INSULIN (HCC): ICD-10-CM

## 2024-10-25 DIAGNOSIS — E11.65 TYPE 2 DIABETES MELLITUS WITH HYPERGLYCEMIA, WITHOUT LONG-TERM CURRENT USE OF INSULIN (HCC): Primary | ICD-10-CM

## 2024-10-25 RX ORDER — PIOGLITAZONEHYDROCHLORIDE 15 MG/1
15 TABLET ORAL DAILY
Qty: 90 TABLET | Refills: 3 | Status: SHIPPED | OUTPATIENT
Start: 2024-10-25

## 2024-10-25 RX ORDER — ROSUVASTATIN CALCIUM 20 MG/1
20 TABLET, COATED ORAL NIGHTLY
Qty: 90 TABLET | Refills: 3 | Status: SHIPPED | OUTPATIENT
Start: 2024-10-25

## 2024-10-25 RX ORDER — BLOOD SUGAR DIAGNOSTIC
STRIP MISCELLANEOUS
Qty: 100 EACH | Refills: 3 | Status: SHIPPED | OUTPATIENT
Start: 2024-10-25

## 2024-10-25 RX ORDER — LOSARTAN POTASSIUM 25 MG/1
25 TABLET ORAL DAILY
Qty: 90 TABLET | Refills: 3 | Status: SHIPPED | OUTPATIENT
Start: 2024-10-25

## 2024-10-25 NOTE — PROGRESS NOTES
[facility] DIABETES AND ENDOCRINOLOGY                 Cris Osuna MD               Patient Information Name : Lasha Lamas  62 y.o.   YOB: 1960           Referred by: Bess Talley MD             Diabetes mellitus follow-up        The patient (or guardian, if applicable) and other individuals in attendance with the patient were advised that Artificial Intelligence will be utilized during this visit to record, process the conversation to generate a clinical note, and support improvement of the AI technology. The patient (or guardian, if applicable) and other individuals in attendance at the appointment consented to the use of AI, including the recording.     History of Present Illness: Lasha Lamas is here for follow-up      Diabetes mellitus was diagnosed in 2021 .  History of Present Illness  October 2024 history    He has a recurring groin rash attributed to hot weather and sweating. Using a cream for yeast infections and deodorant has alleviated the rash.  He has discontinued Xigduo    He is on pioglitazone for diabetes,  Weight stable at 188 lbs. On Xigduo for almost a year, does not regularly monitor blood sugar.        Recently had a kidney ultrasound, awaiting results. Had first appointment with nephrologist                 Past Medical History:   Diagnosis Date    Diabetes (HCC)     Gout     Hypertension     Kidney stone     Type 2 diabetes mellitus (HCC)        Current Outpatient Medications   Medication Sig    pioglitazone (ACTOS) 15 MG tablet Take 1 tablet by mouth daily    losartan (COZAAR) 25 MG tablet Take 1 tablet by mouth daily    rosuvastatin (CRESTOR) 20 MG tablet Take 1 tablet by mouth nightly    blood glucose test strips (ONETOUCH VERIO) strip Use to check BG daily. Dx code E11.65    tadalafil (CIALIS) 10 MG tablet TAKE 1 TABLET BY MOUTH AS NEEDED . DO NOT EXCEED 1 PER 24 HOURS    Blood Glucose Monitoring Suppl (ONETOUCH VERIO) w/Device KIT Use to

## 2025-01-03 ENCOUNTER — LAB (OUTPATIENT)
Age: 65
End: 2025-01-03

## 2025-01-03 DIAGNOSIS — I10 PRIMARY HYPERTENSION: ICD-10-CM

## 2025-01-03 DIAGNOSIS — E11.65 TYPE 2 DIABETES MELLITUS WITH HYPERGLYCEMIA, WITHOUT LONG-TERM CURRENT USE OF INSULIN (HCC): ICD-10-CM

## 2025-01-03 DIAGNOSIS — N18.30 STAGE 3 CHRONIC KIDNEY DISEASE, UNSPECIFIED WHETHER STAGE 3A OR 3B CKD (HCC): ICD-10-CM

## 2025-01-04 LAB
ANION GAP SERPL CALC-SCNC: 4 MMOL/L (ref 2–12)
BUN SERPL-MCNC: 26 MG/DL (ref 6–20)
BUN/CREAT SERPL: 16 (ref 12–20)
CALCIUM SERPL-MCNC: 9.8 MG/DL (ref 8.5–10.1)
CHLORIDE SERPL-SCNC: 101 MMOL/L (ref 97–108)
CHOLEST SERPL-MCNC: 121 MG/DL
CO2 SERPL-SCNC: 29 MMOL/L (ref 21–32)
CREAT SERPL-MCNC: 1.58 MG/DL (ref 0.7–1.3)
CREAT UR-MCNC: 117 MG/DL
EST. AVERAGE GLUCOSE BLD GHB EST-MCNC: 163 MG/DL
GLUCOSE SERPL-MCNC: 122 MG/DL (ref 65–100)
HBA1C MFR BLD: 7.3 % (ref 4–5.6)
HDLC SERPL-MCNC: 40 MG/DL
HDLC SERPL: 3 (ref 0–5)
LDLC SERPL CALC-MCNC: 25.8 MG/DL (ref 0–100)
MICROALBUMIN UR-MCNC: 1.63 MG/DL
MICROALBUMIN/CREAT UR-RTO: 14 MG/G (ref 0–30)
POTASSIUM SERPL-SCNC: 4.5 MMOL/L (ref 3.5–5.1)
SODIUM SERPL-SCNC: 134 MMOL/L (ref 136–145)
TRIGL SERPL-MCNC: 276 MG/DL
VLDLC SERPL CALC-MCNC: 55.2 MG/DL

## 2025-01-10 ENCOUNTER — OFFICE VISIT (OUTPATIENT)
Age: 65
End: 2025-01-10

## 2025-01-10 VITALS
TEMPERATURE: 97.8 F | BODY MASS INDEX: 32.14 KG/M2 | OXYGEN SATURATION: 99 % | HEART RATE: 59 BPM | HEIGHT: 65 IN | SYSTOLIC BLOOD PRESSURE: 127 MMHG | DIASTOLIC BLOOD PRESSURE: 74 MMHG | WEIGHT: 192.9 LBS

## 2025-01-10 DIAGNOSIS — E11.65 TYPE 2 DIABETES MELLITUS WITH HYPERGLYCEMIA, WITHOUT LONG-TERM CURRENT USE OF INSULIN (HCC): Primary | ICD-10-CM

## 2025-01-10 DIAGNOSIS — I10 PRIMARY HYPERTENSION: ICD-10-CM

## 2025-01-10 DIAGNOSIS — N18.30 STAGE 3 CHRONIC KIDNEY DISEASE, UNSPECIFIED WHETHER STAGE 3A OR 3B CKD (HCC): ICD-10-CM

## 2025-01-10 NOTE — PROGRESS NOTES
Lasha Fieldshal is a 64 y.o. male here for   Chief Complaint   Patient presents with    Diabetes       1. Have you been to the ER, urgent care clinic since your last visit?  Hospitalized since your last visit? -NO    2. Have you seen or consulted any other health care providers outside of the [facility] since your last visit?  Include any pap smears or colon screening.-NO

## 2025-01-10 NOTE — PROGRESS NOTES
[facility] DIABETES AND ENDOCRINOLOGY                 Cris Osuna MD               Patient Information Name : Lasha Lamas  62 y.o.   YOB: 1960           Referred by: Bess Talley MD             Diabetes mellitus follow-up        The patient (or guardian, if applicable) and other individuals in attendance with the patient were advised that Artificial Intelligence will be utilized during this visit to record, process the conversation to generate a clinical note, and support improvement of the AI technology. The patient (or guardian, if applicable) and other individuals in attendance at the appointment consented to the use of AI, including the recording.     History of Present Illness: Lasha Lamas is here for follow-up      Diabetes mellitus was diagnosed in 2021 .  History of Present Illness    The patient presents for evaluation of diabetes mellitus.    He has been managing his diabetes with pioglitazone, half the standard dose, for 30 days. No recent groin yeast infections. Weight stable at 188 pounds. No chest pain. Normal urination. Planning travel to [facility] in [facility] weeks, [place] for a couple of weeks,    Experienced bronchitis during Christmas, managed with over-the-counter HBP medication. Unable to take SUDAFED due to high blood pressure.    He has a heel spur and may have surgery for it.                    Past Medical History:   Diagnosis Date    Diabetes (HCC)     Gout     Hypertension     Kidney stone     Type 2 diabetes mellitus (HCC)        Current Outpatient Medications   Medication Sig    Chlorpheniramine-Acetaminophen (CORICIDIN HBP COLD/FLU PO) Take by mouth as needed    DM-Phenylephrine-Acetaminophen (MULTI SYMPTOM FLU/SEVERE COLD PO) Take by mouth as needed    losartan (COZAAR) 25 MG tablet Take 1 tablet by mouth daily    pioglitazone (ACTOS) 15 MG tablet Take 1 tablet by mouth daily    rosuvastatin (CRESTOR) 20 MG tablet Take 1 tablet by

## 2025-05-02 ENCOUNTER — LAB (OUTPATIENT)
Age: 65
End: 2025-05-02

## 2025-05-02 ENCOUNTER — HOSPITAL ENCOUNTER (EMERGENCY)
Facility: HOSPITAL | Age: 65
Discharge: HOME | End: 2025-05-02
Attending: STUDENT IN AN ORGANIZED HEALTH CARE EDUCATION/TRAINING PROGRAM
Payer: COMMERCIAL

## 2025-05-02 VITALS
SYSTOLIC BLOOD PRESSURE: 175 MMHG | WEIGHT: 196 LBS | BODY MASS INDEX: 32.65 KG/M2 | DIASTOLIC BLOOD PRESSURE: 96 MMHG | RESPIRATION RATE: 16 BRPM | TEMPERATURE: 97.9 F | HEART RATE: 61 BPM | OXYGEN SATURATION: 99 % | HEIGHT: 65 IN

## 2025-05-02 DIAGNOSIS — N18.30 STAGE 3 CHRONIC KIDNEY DISEASE, UNSPECIFIED WHETHER STAGE 3A OR 3B CKD (HCC): ICD-10-CM

## 2025-05-02 DIAGNOSIS — R55 VASOVAGAL EPISODE: Primary | ICD-10-CM

## 2025-05-02 DIAGNOSIS — E11.65 TYPE 2 DIABETES MELLITUS WITH HYPERGLYCEMIA, WITHOUT LONG-TERM CURRENT USE OF INSULIN (HCC): ICD-10-CM

## 2025-05-02 LAB
ANION GAP SERPL CALC-SCNC: 6 MMOL/L (ref 2–12)
BUN SERPL-MCNC: 19 MG/DL (ref 6–20)
BUN/CREAT SERPL: 12 (ref 12–20)
CALCIUM SERPL-MCNC: 9.7 MG/DL (ref 8.5–10.1)
CHLORIDE SERPL-SCNC: 102 MMOL/L (ref 97–108)
CO2 SERPL-SCNC: 27 MMOL/L (ref 21–32)
CREAT SERPL-MCNC: 1.6 MG/DL (ref 0.7–1.3)
EKG ATRIAL RATE: 59 BPM
EKG DIAGNOSIS: NORMAL
EKG P AXIS: 52 DEGREES
EKG P-R INTERVAL: 198 MS
EKG Q-T INTERVAL: 382 MS
EKG QRS DURATION: 104 MS
EKG QTC CALCULATION (BAZETT): 378 MS
EKG R AXIS: -32 DEGREES
EKG T AXIS: 12 DEGREES
EKG VENTRICULAR RATE: 59 BPM
EST. AVERAGE GLUCOSE BLD GHB EST-MCNC: 177 MG/DL
GLUCOSE SERPL-MCNC: 109 MG/DL (ref 65–100)
HBA1C MFR BLD: 7.8 % (ref 4–5.6)
POTASSIUM SERPL-SCNC: 4.8 MMOL/L (ref 3.5–5.1)
SODIUM SERPL-SCNC: 135 MMOL/L (ref 136–145)

## 2025-05-02 PROCEDURE — 93010 ELECTROCARDIOGRAM REPORT: CPT | Performed by: INTERNAL MEDICINE

## 2025-05-02 PROCEDURE — 93005 ELECTROCARDIOGRAM TRACING: CPT | Performed by: STUDENT IN AN ORGANIZED HEALTH CARE EDUCATION/TRAINING PROGRAM

## 2025-05-02 PROCEDURE — 99283 EMERGENCY DEPT VISIT LOW MDM: CPT

## 2025-05-02 ASSESSMENT — PAIN DESCRIPTION - ORIENTATION: ORIENTATION: RIGHT

## 2025-05-02 ASSESSMENT — PAIN - FUNCTIONAL ASSESSMENT: PAIN_FUNCTIONAL_ASSESSMENT: 0-10

## 2025-05-02 ASSESSMENT — PAIN DESCRIPTION - DESCRIPTORS: DESCRIPTORS: ACHING

## 2025-05-02 ASSESSMENT — PAIN DESCRIPTION - PAIN TYPE: TYPE: ACUTE PAIN

## 2025-05-02 ASSESSMENT — PAIN SCALES - GENERAL: PAINLEVEL_OUTOF10: 2

## 2025-05-02 ASSESSMENT — PAIN DESCRIPTION - LOCATION: LOCATION: HIP

## 2025-05-02 NOTE — ED TRIAGE NOTES
Pt ambulatory into ER with steady gait with cc of prior episode of hypotension that occurred this morning while patient was at his Endocrinologist appt getting blood drawn. Pt denies current symptoms. Pt denies CP or SOB at time of incident. Pt denies PO intake for 14 hours prior to blood draw. Pt reports prior episode of dizziness around 3 years ago when getting blood drawn.    Pt's only complaint right now is 2/10 r hip pain from recent injury.

## 2025-05-02 NOTE — DISCHARGE INSTRUCTIONS
You were seen today in the emergency department after an episode of hypotension.  Your EKG was reassuring without arrhythmia.  Your blood pressure here is not hypotensive.  Continue to take your medications as prescribed.  Follow-up with your primary care provider.  Return for any new or worsening symptoms.

## 2025-05-02 NOTE — ED PROVIDER NOTES
[place] EMERGENCY DEPARTMENT  EMERGENCY DEPARTMENT ENCOUNTER      Pt Name: Lasha Lamas  MRN: [id number]  Birthdate 1960  Date of evaluation: 5/2/2025  Provider: Isabella Lozada PA-C    CHIEF COMPLAINT       Chief Complaint   Patient presents with    Follow Up After Procedure         HISTORY OF PRESENT ILLNESS   (Location/Symptom, Timing/Onset, Context/Setting, Quality, Duration, Modifying Factors, Severity)  Note limiting factors.   Lasha Lamas is a 64 y.o. male with history of  has a past medical history of Diabetes (HCC), Gout, Hypertension, Kidney stone, and Type 2 diabetes mellitus (HCC). who presents from home to [place] ED with cc of transient episode of hypotension while getting his blood drawn.  Reports he felt well during the episode.  No syncope.  He fasted prior to the labs.  He took all of his blood pressure medication as prescribed this morning prior to the labs being drawn.  Denies chest pain or shortness of breath.  Reports he is feeling very well.      PCP: Bess Talley MD    There are no other complaints, changes or physical findings at this time.        The history is provided by the patient.         Review of External Medical Records:     Nursing Notes were reviewed.    REVIEW OF SYSTEMS    (2-9 systems for level 4, 10 or more for level 5)     Review of Systems   Neurological:  Negative for syncope.       Except as noted above the remainder of the review of systems was reviewed and negative.       PAST MEDICAL HISTORY     Past Medical History:   Diagnosis Date    Diabetes (HCC)     Gout     Hypertension     Kidney stone     Type 2 diabetes mellitus (HCC)          SURGICAL HISTORY     History reviewed. No pertinent surgical history.      CURRENT MEDICATIONS       Discharge Medication List as of 5/2/2025 12:52 PM        CONTINUE these medications which have NOT CHANGED    Details   Chlorpheniramine-Acetaminophen (CORICIDIN HBP COLD/FLU PO) Take by mouth as

## 2025-05-09 ENCOUNTER — OFFICE VISIT (OUTPATIENT)
Age: 65
End: 2025-05-09
Payer: COMMERCIAL

## 2025-05-09 VITALS
HEART RATE: 71 BPM | HEIGHT: 65 IN | TEMPERATURE: 98.3 F | RESPIRATION RATE: 18 BRPM | BODY MASS INDEX: 33.15 KG/M2 | SYSTOLIC BLOOD PRESSURE: 118 MMHG | DIASTOLIC BLOOD PRESSURE: 77 MMHG | OXYGEN SATURATION: 95 % | WEIGHT: 199 LBS

## 2025-05-09 DIAGNOSIS — I10 PRIMARY HYPERTENSION: ICD-10-CM

## 2025-05-09 DIAGNOSIS — N18.30 STAGE 3 CHRONIC KIDNEY DISEASE, UNSPECIFIED WHETHER STAGE 3A OR 3B CKD (HCC): ICD-10-CM

## 2025-05-09 DIAGNOSIS — E11.65 TYPE 2 DIABETES MELLITUS WITH HYPERGLYCEMIA, WITHOUT LONG-TERM CURRENT USE OF INSULIN (HCC): Primary | ICD-10-CM

## 2025-05-09 PROCEDURE — 2022F DILAT RTA XM EVC RTNOPTHY: CPT | Performed by: INTERNAL MEDICINE

## 2025-05-09 PROCEDURE — G8427 DOCREV CUR MEDS BY ELIG CLIN: HCPCS | Performed by: INTERNAL MEDICINE

## 2025-05-09 PROCEDURE — 99214 OFFICE O/P EST MOD 30 MIN: CPT | Performed by: INTERNAL MEDICINE

## 2025-05-09 PROCEDURE — 3078F DIAST BP <80 MM HG: CPT | Performed by: INTERNAL MEDICINE

## 2025-05-09 PROCEDURE — G2211 COMPLEX E/M VISIT ADD ON: HCPCS | Performed by: INTERNAL MEDICINE

## 2025-05-09 PROCEDURE — 3017F COLORECTAL CA SCREEN DOC REV: CPT | Performed by: INTERNAL MEDICINE

## 2025-05-09 PROCEDURE — 3074F SYST BP LT 130 MM HG: CPT | Performed by: INTERNAL MEDICINE

## 2025-05-09 PROCEDURE — 1036F TOBACCO NON-USER: CPT | Performed by: INTERNAL MEDICINE

## 2025-05-09 PROCEDURE — G8417 CALC BMI ABV UP PARAM F/U: HCPCS | Performed by: INTERNAL MEDICINE

## 2025-05-09 PROCEDURE — 1123F ACP DISCUSS/DSCN MKR DOCD: CPT | Performed by: INTERNAL MEDICINE

## 2025-05-09 PROCEDURE — 3051F HG A1C>EQUAL 7.0%<8.0%: CPT | Performed by: INTERNAL MEDICINE

## 2025-05-09 NOTE — PROGRESS NOTES
[facility] DIABETES AND ENDOCRINOLOGY                 Cris Osuna MD               Patient Information Name : Lasha Lamas  62 y.o.   YOB: 1960           Referred by: Bess Talley MD             Diabetes mellitus follow-up        The patient (or guardian, if applicable) and other individuals in attendance with the patient were advised that Artificial Intelligence will be utilized during this visit to record, process the conversation to generate a clinical note, and support improvement of the AI technology. The patient (or guardian, if applicable) and other individuals in attendance at the appointment consented to the use of AI, including the recording.     History of Present Illness: Lasha Lamas is here for follow-up      Diabetes mellitus was diagnosed in 2021 .  History of Present Illness    The patient presents for evaluation and management of diabetes mellitus and sinus bradycardia.    Diabetes Mellitus  - The patient is currently prescribed 15 mg of pioglitazone, which he reports as beneficial.  BG is high   No steroids  May be less activity     Sinus Bradycardia  - The patient reports stable blood pressure, as confirmed by Dr. Talley, who adjusted his medication regimen on 05/04/2025.      Weight Gain  - The patient acknowledges weight gain secondary to a recent injury that limited physical activity; however, he has since resumed his exercise routine.      Supplemental information: None                Past Medical History:   Diagnosis Date    Diabetes (HCC)     Gout     Hypertension     Kidney stone     Type 2 diabetes mellitus (HCC)        Current Outpatient Medications   Medication Sig    Chlorpheniramine-Acetaminophen (CORICIDIN HBP COLD/FLU PO) Take by mouth as needed    DM-Phenylephrine-Acetaminophen (MULTI SYMPTOM FLU/SEVERE COLD PO) Take by mouth as needed    losartan (COZAAR) 25 MG tablet Take 1 tablet by mouth daily    pioglitazone (ACTOS) 15 MG

## 2025-05-09 NOTE — PROGRESS NOTES
Lasha Lamas is a 64 y.o. male here for   Chief Complaint   Patient presents with    Diabetes       1. Have you been to the ER, urgent care clinic since your last visit?  Hospitalized since your last visit? [place] [place]ov[place]    2. Have you seen or consulted any other health care providers outside of the [facility] since your last visit?  Include any pap smears or colon screening.-PCP and nephrology

## 2025-08-11 ENCOUNTER — LAB (OUTPATIENT)
Age: 65
End: 2025-08-11

## 2025-08-11 DIAGNOSIS — E11.65 TYPE 2 DIABETES MELLITUS WITH HYPERGLYCEMIA, WITHOUT LONG-TERM CURRENT USE OF INSULIN (HCC): ICD-10-CM

## 2025-08-11 DIAGNOSIS — N18.30 STAGE 3 CHRONIC KIDNEY DISEASE, UNSPECIFIED WHETHER STAGE 3A OR 3B CKD (HCC): ICD-10-CM

## 2025-08-11 LAB
ANION GAP SERPL CALC-SCNC: 12 MMOL/L (ref 2–14)
BUN SERPL-MCNC: 27 MG/DL (ref 8–23)
BUN/CREAT SERPL: 15 (ref 12–20)
CALCIUM SERPL-MCNC: 9.8 MG/DL (ref 8.8–10.2)
CHLORIDE SERPL-SCNC: 102 MMOL/L (ref 98–107)
CO2 SERPL-SCNC: 27 MMOL/L (ref 20–29)
CREAT SERPL-MCNC: 1.77 MG/DL (ref 0.7–1.2)
CREAT UR-MCNC: 129 MG/DL (ref 39–259)
EST. AVERAGE GLUCOSE BLD GHB EST-MCNC: 177 MG/DL
GLUCOSE SERPL-MCNC: 189 MG/DL (ref 65–100)
HBA1C MFR BLD: 7.8 % (ref 4–5.6)
LDLC SERPL DIRECT ASSAY-MCNC: 23 MG/DL (ref 0–100)
MICROALBUMIN UR-MCNC: 2.84 MG/DL
MICROALBUMIN/CREAT UR-RTO: 22 MG/G
POTASSIUM SERPL-SCNC: 4.4 MMOL/L (ref 3.5–5.1)
SODIUM SERPL-SCNC: 141 MMOL/L (ref 136–145)

## 2025-08-22 ENCOUNTER — OFFICE VISIT (OUTPATIENT)
Age: 65
End: 2025-08-22
Payer: COMMERCIAL

## 2025-08-22 VITALS
HEART RATE: 60 BPM | WEIGHT: 197.5 LBS | OXYGEN SATURATION: 98 % | HEIGHT: 65 IN | BODY MASS INDEX: 32.9 KG/M2 | TEMPERATURE: 98.3 F | SYSTOLIC BLOOD PRESSURE: 148 MMHG | DIASTOLIC BLOOD PRESSURE: 85 MMHG

## 2025-08-22 DIAGNOSIS — I10 PRIMARY HYPERTENSION: ICD-10-CM

## 2025-08-22 DIAGNOSIS — E11.65 TYPE 2 DIABETES MELLITUS WITH HYPERGLYCEMIA, WITHOUT LONG-TERM CURRENT USE OF INSULIN (HCC): Primary | ICD-10-CM

## 2025-08-22 DIAGNOSIS — N18.30 STAGE 3 CHRONIC KIDNEY DISEASE, UNSPECIFIED WHETHER STAGE 3A OR 3B CKD (HCC): ICD-10-CM

## 2025-08-22 PROCEDURE — G8417 CALC BMI ABV UP PARAM F/U: HCPCS | Performed by: INTERNAL MEDICINE

## 2025-08-22 PROCEDURE — 3051F HG A1C>EQUAL 7.0%<8.0%: CPT | Performed by: INTERNAL MEDICINE

## 2025-08-22 PROCEDURE — 3077F SYST BP >= 140 MM HG: CPT | Performed by: INTERNAL MEDICINE

## 2025-08-22 PROCEDURE — 1126F AMNT PAIN NOTED NONE PRSNT: CPT | Performed by: INTERNAL MEDICINE

## 2025-08-22 PROCEDURE — 3017F COLORECTAL CA SCREEN DOC REV: CPT | Performed by: INTERNAL MEDICINE

## 2025-08-22 PROCEDURE — 1036F TOBACCO NON-USER: CPT | Performed by: INTERNAL MEDICINE

## 2025-08-22 PROCEDURE — 1160F RVW MEDS BY RX/DR IN RCRD: CPT | Performed by: INTERNAL MEDICINE

## 2025-08-22 PROCEDURE — 3079F DIAST BP 80-89 MM HG: CPT | Performed by: INTERNAL MEDICINE

## 2025-08-22 PROCEDURE — G2211 COMPLEX E/M VISIT ADD ON: HCPCS | Performed by: INTERNAL MEDICINE

## 2025-08-22 PROCEDURE — G8427 DOCREV CUR MEDS BY ELIG CLIN: HCPCS | Performed by: INTERNAL MEDICINE

## 2025-08-22 PROCEDURE — 99214 OFFICE O/P EST MOD 30 MIN: CPT | Performed by: INTERNAL MEDICINE

## 2025-08-22 PROCEDURE — 1159F MED LIST DOCD IN RCRD: CPT | Performed by: INTERNAL MEDICINE

## 2025-08-22 PROCEDURE — 2022F DILAT RTA XM EVC RTNOPTHY: CPT | Performed by: INTERNAL MEDICINE

## 2025-08-22 PROCEDURE — 1123F ACP DISCUSS/DSCN MKR DOCD: CPT | Performed by: INTERNAL MEDICINE

## 2025-08-22 RX ORDER — METFORMIN HYDROCHLORIDE 750 MG/1
750 TABLET, EXTENDED RELEASE ORAL
Qty: 90 TABLET | Refills: 3 | Status: SHIPPED | OUTPATIENT
Start: 2025-08-22